# Patient Record
Sex: FEMALE | Race: WHITE | NOT HISPANIC OR LATINO
[De-identification: names, ages, dates, MRNs, and addresses within clinical notes are randomized per-mention and may not be internally consistent; named-entity substitution may affect disease eponyms.]

---

## 2017-05-04 ENCOUNTER — APPOINTMENT (OUTPATIENT)
Dept: DERMATOLOGY | Facility: CLINIC | Age: 42
End: 2017-05-04

## 2017-05-04 VITALS — HEIGHT: 62 IN | WEIGHT: 124 LBS | BODY MASS INDEX: 22.82 KG/M2

## 2017-05-04 DIAGNOSIS — Z78.9 OTHER SPECIFIED HEALTH STATUS: ICD-10-CM

## 2017-05-04 DIAGNOSIS — F17.200 NICOTINE DEPENDENCE, UNSPECIFIED, UNCOMPLICATED: ICD-10-CM

## 2017-05-04 DIAGNOSIS — Z86.69 PERSONAL HISTORY OF OTHER DISEASES OF THE NERVOUS SYSTEM AND SENSE ORGANS: ICD-10-CM

## 2017-05-04 DIAGNOSIS — Z86.79 PERSONAL HISTORY OF OTHER DISEASES OF THE CIRCULATORY SYSTEM: ICD-10-CM

## 2017-05-04 DIAGNOSIS — I78.1 NEVUS, NON-NEOPLASTIC: ICD-10-CM

## 2017-05-23 ENCOUNTER — APPOINTMENT (OUTPATIENT)
Dept: DERMATOLOGY | Facility: CLINIC | Age: 42
End: 2017-05-23

## 2017-05-23 DIAGNOSIS — Z41.1 ENCOUNTER FOR COSMETIC SURGERY: ICD-10-CM

## 2017-12-24 ENCOUNTER — INPATIENT (INPATIENT)
Facility: HOSPITAL | Age: 42
LOS: 2 days | Discharge: HOME | End: 2017-12-27
Attending: INTERNAL MEDICINE | Admitting: INTERNAL MEDICINE
Payer: COMMERCIAL

## 2017-12-24 VITALS — HEIGHT: 62 IN | WEIGHT: 117.07 LBS

## 2017-12-24 DIAGNOSIS — N20.0 CALCULUS OF KIDNEY: ICD-10-CM

## 2017-12-24 DIAGNOSIS — Z98.891 HISTORY OF UTERINE SCAR FROM PREVIOUS SURGERY: Chronic | ICD-10-CM

## 2017-12-24 LAB
ALBUMIN SERPL ELPH-MCNC: 4 G/DL — SIGNIFICANT CHANGE UP (ref 3.3–5)
ALP SERPL-CCNC: 84 U/L — SIGNIFICANT CHANGE UP (ref 40–120)
ALT FLD-CCNC: 18 U/L — SIGNIFICANT CHANGE UP (ref 12–78)
ANION GAP SERPL CALC-SCNC: 9 MMOL/L — SIGNIFICANT CHANGE UP (ref 5–17)
APPEARANCE UR: (no result)
AST SERPL-CCNC: 15 U/L — SIGNIFICANT CHANGE UP (ref 15–37)
BACTERIA # UR AUTO: (no result)
BILIRUB SERPL-MCNC: 0.9 MG/DL — SIGNIFICANT CHANGE UP (ref 0.2–1.2)
BILIRUB UR-MCNC: NEGATIVE — SIGNIFICANT CHANGE UP
BUN SERPL-MCNC: 21 MG/DL — SIGNIFICANT CHANGE UP (ref 7–23)
CALCIUM SERPL-MCNC: 9 MG/DL — SIGNIFICANT CHANGE UP (ref 8.5–10.1)
CHLORIDE SERPL-SCNC: 105 MMOL/L — SIGNIFICANT CHANGE UP (ref 96–108)
CO2 SERPL-SCNC: 22 MMOL/L — SIGNIFICANT CHANGE UP (ref 22–31)
COLOR SPEC: YELLOW — SIGNIFICANT CHANGE UP
CREAT SERPL-MCNC: 1.15 MG/DL — SIGNIFICANT CHANGE UP (ref 0.5–1.3)
DIFF PNL FLD: (no result)
EPI CELLS # UR: (no result)
GLUCOSE SERPL-MCNC: 102 MG/DL — HIGH (ref 70–99)
GLUCOSE UR QL: NEGATIVE MG/DL — SIGNIFICANT CHANGE UP
HCG SERPL-ACNC: <1 MIU/ML — SIGNIFICANT CHANGE UP
HCT VFR BLD CALC: 40.8 % — SIGNIFICANT CHANGE UP (ref 34.5–45)
HGB BLD-MCNC: 13.5 G/DL — SIGNIFICANT CHANGE UP (ref 11.5–15.5)
KETONES UR-MCNC: (no result)
LACTATE SERPL-SCNC: 1.1 MMOL/L — SIGNIFICANT CHANGE UP (ref 0.7–2)
LEUKOCYTE ESTERASE UR-ACNC: (no result)
LIDOCAIN IGE QN: 50 U/L — LOW (ref 73–393)
LYMPHOCYTES # BLD AUTO: 1 % — LOW (ref 13–44)
MANUAL DIF COMMENT BLD-IMP: SIGNIFICANT CHANGE UP
MCHC RBC-ENTMCNC: 30.8 PG — SIGNIFICANT CHANGE UP (ref 27–34)
MCHC RBC-ENTMCNC: 33.2 GM/DL — SIGNIFICANT CHANGE UP (ref 32–36)
MCV RBC AUTO: 92.9 FL — SIGNIFICANT CHANGE UP (ref 80–100)
NEUTROPHILS NFR BLD AUTO: 91 % — HIGH (ref 43–77)
NEUTS BAND # BLD: 8 % — SIGNIFICANT CHANGE UP (ref 0–8)
NITRITE UR-MCNC: NEGATIVE — SIGNIFICANT CHANGE UP
PH UR: 5 — SIGNIFICANT CHANGE UP (ref 5–8)
PLAT MORPH BLD: NORMAL — SIGNIFICANT CHANGE UP
PLATELET # BLD AUTO: 225 K/UL — SIGNIFICANT CHANGE UP (ref 150–400)
POTASSIUM SERPL-MCNC: 3.8 MMOL/L — SIGNIFICANT CHANGE UP (ref 3.5–5.3)
POTASSIUM SERPL-SCNC: 3.8 MMOL/L — SIGNIFICANT CHANGE UP (ref 3.5–5.3)
PROT SERPL-MCNC: 7.6 GM/DL — SIGNIFICANT CHANGE UP (ref 6–8.3)
PROT UR-MCNC: 15 MG/DL
RBC # BLD: 4.39 M/UL — SIGNIFICANT CHANGE UP (ref 3.8–5.2)
RBC # FLD: 11.4 % — SIGNIFICANT CHANGE UP (ref 10.3–14.5)
RBC BLD AUTO: NORMAL — SIGNIFICANT CHANGE UP
RBC CASTS # UR COMP ASSIST: (no result) /HPF (ref 0–4)
SODIUM SERPL-SCNC: 136 MMOL/L — SIGNIFICANT CHANGE UP (ref 135–145)
SP GR SPEC: 1.01 — SIGNIFICANT CHANGE UP (ref 1.01–1.02)
UROBILINOGEN FLD QL: NEGATIVE MG/DL — SIGNIFICANT CHANGE UP
WBC # BLD: 27.2 K/UL — HIGH (ref 3.8–10.5)
WBC # FLD AUTO: 27.2 K/UL — HIGH (ref 3.8–10.5)
WBC UR QL: (no result)

## 2017-12-24 PROCEDURE — 52332 CYSTOSCOPY AND TREATMENT: CPT | Mod: LT

## 2017-12-24 PROCEDURE — 74176 CT ABD & PELVIS W/O CONTRAST: CPT | Mod: 26

## 2017-12-24 PROCEDURE — 99253 IP/OBS CNSLTJ NEW/EST LOW 45: CPT | Mod: 25

## 2017-12-24 PROCEDURE — 99285 EMERGENCY DEPT VISIT HI MDM: CPT

## 2017-12-24 RX ORDER — MORPHINE SULFATE 50 MG/1
4 CAPSULE, EXTENDED RELEASE ORAL ONCE
Qty: 0 | Refills: 0 | Status: DISCONTINUED | OUTPATIENT
Start: 2017-12-24 | End: 2017-12-24

## 2017-12-24 RX ORDER — OXYCODONE AND ACETAMINOPHEN 5; 325 MG/1; MG/1
2 TABLET ORAL ONCE
Qty: 0 | Refills: 0 | Status: DISCONTINUED | OUTPATIENT
Start: 2017-12-24 | End: 2017-12-24

## 2017-12-24 RX ORDER — SODIUM CHLORIDE 9 MG/ML
1000 INJECTION INTRAMUSCULAR; INTRAVENOUS; SUBCUTANEOUS ONCE
Qty: 0 | Refills: 0 | Status: COMPLETED | OUTPATIENT
Start: 2017-12-24 | End: 2017-12-24

## 2017-12-24 RX ORDER — ACETAMINOPHEN 500 MG
650 TABLET ORAL EVERY 6 HOURS
Qty: 0 | Refills: 0 | Status: DISCONTINUED | OUTPATIENT
Start: 2017-12-24 | End: 2017-12-24

## 2017-12-24 RX ORDER — ATENOLOL 25 MG/1
0 TABLET ORAL
Qty: 0 | Refills: 0 | COMMUNITY

## 2017-12-24 RX ORDER — ONDANSETRON 8 MG/1
4 TABLET, FILM COATED ORAL ONCE
Qty: 0 | Refills: 0 | Status: DISCONTINUED | OUTPATIENT
Start: 2017-12-24 | End: 2017-12-24

## 2017-12-24 RX ORDER — ONDANSETRON 8 MG/1
4 TABLET, FILM COATED ORAL EVERY 6 HOURS
Qty: 0 | Refills: 0 | Status: DISCONTINUED | OUTPATIENT
Start: 2017-12-24 | End: 2017-12-24

## 2017-12-24 RX ORDER — ATENOLOL 25 MG/1
25 TABLET ORAL DAILY
Qty: 0 | Refills: 0 | Status: DISCONTINUED | OUTPATIENT
Start: 2017-12-24 | End: 2017-12-27

## 2017-12-24 RX ORDER — FENTANYL CITRATE 50 UG/ML
50 INJECTION INTRAVENOUS
Qty: 0 | Refills: 0 | Status: DISCONTINUED | OUTPATIENT
Start: 2017-12-24 | End: 2017-12-24

## 2017-12-24 RX ORDER — SODIUM CHLORIDE 9 MG/ML
1000 INJECTION INTRAMUSCULAR; INTRAVENOUS; SUBCUTANEOUS
Qty: 0 | Refills: 0 | Status: DISCONTINUED | OUTPATIENT
Start: 2017-12-24 | End: 2017-12-24

## 2017-12-24 RX ORDER — ACETAMINOPHEN 500 MG
650 TABLET ORAL EVERY 6 HOURS
Qty: 0 | Refills: 0 | Status: DISCONTINUED | OUTPATIENT
Start: 2017-12-24 | End: 2017-12-27

## 2017-12-24 RX ORDER — HEPARIN SODIUM 5000 [USP'U]/ML
5000 INJECTION INTRAVENOUS; SUBCUTANEOUS EVERY 8 HOURS
Qty: 0 | Refills: 0 | Status: DISCONTINUED | OUTPATIENT
Start: 2017-12-24 | End: 2017-12-24

## 2017-12-24 RX ORDER — MEPERIDINE HYDROCHLORIDE 50 MG/ML
12.5 INJECTION INTRAMUSCULAR; INTRAVENOUS; SUBCUTANEOUS
Qty: 0 | Refills: 0 | Status: DISCONTINUED | OUTPATIENT
Start: 2017-12-24 | End: 2017-12-24

## 2017-12-24 RX ORDER — MORPHINE SULFATE 50 MG/1
4 CAPSULE, EXTENDED RELEASE ORAL EVERY 4 HOURS
Qty: 0 | Refills: 0 | Status: DISCONTINUED | OUTPATIENT
Start: 2017-12-24 | End: 2017-12-24

## 2017-12-24 RX ORDER — OXYCODONE HYDROCHLORIDE 5 MG/1
5 TABLET ORAL EVERY 4 HOURS
Qty: 0 | Refills: 0 | Status: DISCONTINUED | OUTPATIENT
Start: 2017-12-24 | End: 2017-12-24

## 2017-12-24 RX ORDER — CIPROFLOXACIN LACTATE 400MG/40ML
400 VIAL (ML) INTRAVENOUS ONCE
Qty: 0 | Refills: 0 | Status: COMPLETED | OUTPATIENT
Start: 2017-12-24 | End: 2017-12-24

## 2017-12-24 RX ORDER — ATENOLOL 25 MG/1
25 TABLET ORAL DAILY
Qty: 0 | Refills: 0 | Status: DISCONTINUED | OUTPATIENT
Start: 2017-12-24 | End: 2017-12-24

## 2017-12-24 RX ORDER — CIPROFLOXACIN LACTATE 400MG/40ML
400 VIAL (ML) INTRAVENOUS EVERY 12 HOURS
Qty: 0 | Refills: 0 | Status: DISCONTINUED | OUTPATIENT
Start: 2017-12-24 | End: 2017-12-27

## 2017-12-24 RX ORDER — SENNA PLUS 8.6 MG/1
2 TABLET ORAL AT BEDTIME
Qty: 0 | Refills: 0 | Status: DISCONTINUED | OUTPATIENT
Start: 2017-12-24 | End: 2017-12-24

## 2017-12-24 RX ORDER — CIPROFLOXACIN LACTATE 400MG/40ML
400 VIAL (ML) INTRAVENOUS EVERY 12 HOURS
Qty: 0 | Refills: 0 | Status: DISCONTINUED | OUTPATIENT
Start: 2017-12-24 | End: 2017-12-24

## 2017-12-24 RX ADMIN — SODIUM CHLORIDE 1000 MILLILITER(S): 9 INJECTION INTRAMUSCULAR; INTRAVENOUS; SUBCUTANEOUS at 12:33

## 2017-12-24 RX ADMIN — Medication 200 MILLIGRAM(S): at 12:33

## 2017-12-24 RX ADMIN — MORPHINE SULFATE 4 MILLIGRAM(S): 50 CAPSULE, EXTENDED RELEASE ORAL at 15:31

## 2017-12-24 RX ADMIN — MORPHINE SULFATE 4 MILLIGRAM(S): 50 CAPSULE, EXTENDED RELEASE ORAL at 10:16

## 2017-12-24 RX ADMIN — SODIUM CHLORIDE 1000 MILLILITER(S): 9 INJECTION INTRAMUSCULAR; INTRAVENOUS; SUBCUTANEOUS at 09:35

## 2017-12-24 RX ADMIN — Medication 200 MILLIGRAM(S): at 23:22

## 2017-12-24 RX ADMIN — SODIUM CHLORIDE 100 MILLILITER(S): 9 INJECTION INTRAMUSCULAR; INTRAVENOUS; SUBCUTANEOUS at 18:46

## 2017-12-24 RX ADMIN — MORPHINE SULFATE 4 MILLIGRAM(S): 50 CAPSULE, EXTENDED RELEASE ORAL at 09:50

## 2017-12-24 RX ADMIN — MORPHINE SULFATE 4 MILLIGRAM(S): 50 CAPSULE, EXTENDED RELEASE ORAL at 09:35

## 2017-12-24 RX ADMIN — OXYCODONE AND ACETAMINOPHEN 2 TABLET(S): 5; 325 TABLET ORAL at 23:23

## 2017-12-24 RX ADMIN — ATENOLOL 25 MILLIGRAM(S): 25 TABLET ORAL at 16:59

## 2017-12-24 RX ADMIN — SODIUM CHLORIDE 100 MILLILITER(S): 9 INJECTION INTRAMUSCULAR; INTRAVENOUS; SUBCUTANEOUS at 16:09

## 2017-12-24 NOTE — ED PROVIDER NOTE - OBJECTIVE STATEMENT
41 y/o F presents to the ED c/o severe left back pain starting this morning. Pt with PSHx of . Pt has hx of severe menstrual cramps. Pain is in left lower back radiating to abd. No N/V/D. No fever. No falls. No dysuria. Pt can not find a comfortable position. Current smoker. Drinks occasionally. No CP. No HA. No SOB.

## 2017-12-24 NOTE — ED PROVIDER NOTE - PROGRESS NOTE DETAILS
Attending Dr. Josh Knight Dean Kaplan on behalf of Attending Dr. Knight. Spoke with Dr. Moreno. Feels Pt may need a stent. Will admit. Spoke with Dr. Cuadra about admission.

## 2017-12-24 NOTE — H&P ADULT - NSHPSOCIALHISTORY_GEN_ALL_CORE
Chronic cigarette smoker off and on for years 1PPD.  Admits to alcohol use (beers and tequila) periodically.  Going through a divorce.  Has a child 6 yr old.

## 2017-12-24 NOTE — ED ADULT NURSE NOTE - CHIEF COMPLAINT QUOTE
L side back. Patient states she has really bad menstual pain and heavy duty medicine not working. Whole left side of her back is so painful since 5 pm lst night.

## 2017-12-24 NOTE — CONSULT NOTE ADULT - PROBLEM SELECTOR RECOMMENDATION 9
A stent is recommended with ueteroscopy at a later date due to the white count and bacteriuria.  If this is not successful a nephrostomy will be done.

## 2017-12-24 NOTE — ED PROVIDER NOTE - CONSTITUTIONAL, MLM
normal... Well appearing, well nourished, awake, alert, oriented to person, place, time/situation. Pt in moderate distress due to pain.

## 2017-12-24 NOTE — H&P ADULT - NSHPPHYSICALEXAM_GEN_ALL_CORE
Vital Signs Last 24 Hrs  T(C): 37 (24 Dec 2017 08:48), Max: 37 (24 Dec 2017 08:48)  T(F): 98.6 (24 Dec 2017 08:48), Max: 98.6 (24 Dec 2017 08:48)  HR: 93 (24 Dec 2017 08:48) (93 - 93)  BP: 132/64 (24 Dec 2017 08:48) (132/64 - 132/64)  BP(mean): --  RR: 19 (24 Dec 2017 08:48) (19 - 19)  SpO2: 100% (24 Dec 2017 08:48) (100% - 100%)    PHYSICAL EXAM:    Constitutional: NAD, awake and alert, well-developed  HEENT: PERR, EOMI, Normal Hearing, MMM  Neck: Soft and supple  Respiratory: Breath sounds are clear bilaterally, No wheezing, rales or rhonchi  Cardiovascular: S1 and S2, regular rate and rhythm, no Murmurs, gallops or rubs  Gastrointestinal: Bowel Sounds present, soft, nontender, nondistended, no guarding, no rebound,   : + left sided flank pain/tenderness on palpation.  Extremities: No peripheral edema  Neurological: A/O x 3, no focal deficits  Skin: No rashes

## 2017-12-24 NOTE — H&P ADULT - ASSESSMENT
42 year old woman with HTN p/w left sided flank pain secondary to nephrolithiasis.    Nephrolithiasis with UTI/pyelonephritis  -profound leukocytosis  -0.4cm uterovesicular stone   -Uro consult - for stent placement  -c/w IV abx, ID consult (pen allergic)  -f/u Blood and Urine cultures  -NPO  -IVFs  -pain management    HTN:  -c/w atenolol    DVT ppx:  -heparin subc

## 2017-12-24 NOTE — H&P ADULT - HISTORY OF PRESENT ILLNESS
41 y/o woman with PMHx of heavy menstrual cramps, HTN who p/w severe left back pain starting this morning.  States pain is not improving with over the counter meds, unable to find a comfortable position.  She states she has had menstrual cramp pain but this pain is different, more severe and not improving. She denies fever, chills but admits to some nausea.    In ER: Found to have 0.4cm left sided kidney stone. She was given 2L bolus IVFs, IV cipro, cultures were drawn and urology was consulted.

## 2017-12-24 NOTE — BRIEF OPERATIVE NOTE - PROCEDURE
<<-----Click on this checkbox to enter Procedure Cystoscopy with insertion of ureteral stent  12/24/2017    Active  [facility]

## 2017-12-24 NOTE — CONSULT NOTE ADULT - SUBJECTIVE AND OBJECTIVE BOX
CHIEF COMPLAINT:Left flank pain    HISTORY OF PRESENT ILLNESS:Distal left stone, pain, white count    PAST MEDICAL & SURGICAL HISTORY:  Menorrhalgia  Essential hypertension  History of       REVIEW OF SYSTEMS:    CONSTITUTIONAL: In some distress  EYES/ENT: No visual changes;  No vertigo or throat pain   NECK: No pain or stiffness  RESPIRATORY: No cough, wheezing, hemoptysis; No shortness of breath  CARDIOVASCULAR: No chest pain or palpitations  GASTROINTESTINAL: No abdominal or epigastric pain. No nausea, vomiting, or hematemesis; No diarrhea or constipation. No melena or hematochezia.  GENITOURINARY: No dysuria, frequency or hematuria  NEUROLOGICAL: No numbness or weakness  SKIN: No itching, burning, rashes, or lesions   All other review of systems is negative unless indicated above.    MEDICATIONS  (STANDING):  ATENolol  Tablet 25 milliGRAM(s) Oral daily  ciprofloxacin   IVPB 400 milliGRAM(s) IV Intermittent every 12 hours  heparin  Injectable 5000 Unit(s) SubCutaneous every 8 hours  morphine  - Injectable 4 milliGRAM(s) IV Push once  sodium chloride 0.9%. 1000 milliLiter(s) (100 mL/Hr) IV Continuous <Continuous>    MEDICATIONS  (PRN):  acetaminophen   Tablet 650 milliGRAM(s) Oral every 6 hours PRN For Temp greater than 38 C (100.4 F)  acetaminophen   Tablet. 650 milliGRAM(s) Oral every 6 hours PRN Moderate Pain (4 - 6)  morphine  - Injectable 4 milliGRAM(s) IV Push every 4 hours PRN Severe Pain (7 - 10)  ondansetron Injectable 4 milliGRAM(s) IV Push every 6 hours PRN Nausea  senna 2 Tablet(s) Oral at bedtime PRN Constipation      Allergies    Cefzil (Hives)    Intolerances:Unknown        SOCIAL HISTORY:Homemaker    FAMILY HISTORY:  No pertinent family history in first degree relatives      Vital Signs Last 24 Hrs  T(C): 37 (24 Dec 2017 08:48), Max: 37 (24 Dec 2017 08:48)  T(F): 98.6 (24 Dec 2017 08:48), Max: 98.6 (24 Dec 2017 08:48)  HR: 93 (24 Dec 2017 08:48) (93 - 93)  BP: 132/64 (24 Dec 2017 08:48) (132/64 - 132/64)  BP(mean): --  RR: 19 (24 Dec 2017 08:48) (19 - 19)  SpO2: 100% (24 Dec 2017 08:48) (100% - 100%)    PHYSICAL EXAM:    Constitutional: NAD, well-developed  HEENT: RAMON, EOMI, Normal Hearing, MMM  Neck: No LAD, No JVD  Back: Normal spine flexure, No CVA tenderness  Respiratory: CTAB   Cardiovascular: S1 and S2, RRR, no M/G/R  Abd: BS+, soft, NT/ND, No C  Extremities: No peripheral edema  Vascular: 2+ peripheral pulses  Neurological: A/O x 3, no focal deficits  Psychiatric: Normal mood, normal affect  Musculoskeletal: 5/5 strength b/l upper and lower extremities  Skin: No rashes    LABS:                        13.5   27.2  )-----------( 225      ( 24 Dec 2017 09:36 )             40.8     12-    136  |  105  |  21  ----------------------------<  102<H>  3.8   |  22  |  1.15    Ca    9.0      24 Dec 2017 09:36    TPro  7.6  /  Alb  4.0  /  TBili  0.9  /  DBili  x   /  AST  15  /  ALT  18  /  AlkPhos  84  12-24      Urinalysis Basic - ( 24 Dec 2017 10:56 )    Color: Yellow / Appearance: Slightly Turbid / S.015 / pH: x  Gluc: x / Ketone: Moderate  / Bili: Negative / Urobili: Negative mg/dL   Blood: x / Protein: 15 mg/dL / Nitrite: Negative   Leuk Esterase: Moderate / RBC: 6-10 /HPF / WBC 26-50   Sq Epi: x / Non Sq Epi: Many / Bacteria: TNTC      Urine Culture:     RADIOLOGY & ADDITIONAL STUDIES:

## 2017-12-24 NOTE — H&P ADULT - NSHPLABSRESULTS_GEN_ALL_CORE
13.5   27.2  )-----------( 225      ( 24 Dec 2017 09:36 )             40.8     12    136  |  105  |  21  ----------------------------<  102<H>  3.8   |  22  |  1.15    Ca    9.0      24 Dec 2017 09:36    TPro  7.6  /  Alb  4.0  /  TBili  0.9  /  DBili  x   /  AST  15  /  ALT  18  /  AlkPhos  84  12-    CAPILLARY BLOOD GLUCOSE        LIVER FUNCTIONS - ( 24 Dec 2017 09:36 )  Alb: 4.0 g/dL / Pro: 7.6 gm/dL / ALK PHOS: 84 U/L / ALT: 18 U/L / AST: 15 U/L / GGT: x             Urinalysis Basic - ( 24 Dec 2017 10:56 )    Color: Yellow / Appearance: Slightly Turbid / S.015 / pH: x  Gluc: x / Ketone: Moderate  / Bili: Negative / Urobili: Negative mg/dL   Blood: x / Protein: 15 mg/dL / Nitrite: Negative   Leuk Esterase: Moderate / RBC: 6-10 /HPF / WBC 26-50   Sq Epi: x / Non Sq Epi: Many / Bacteria: TNTC      < from: CT Abdomen and Pelvis No Cont (. @ 12:22) >    IMPRESSION:     Mild left hydroureterOnephrosis secondary to a 0.4 cm calculus at the   ureteral aspect of the left ureterovesicular junction. Trace to small   left perinephric fluid and perinephric stranding.

## 2017-12-25 DIAGNOSIS — N12 TUBULO-INTERSTITIAL NEPHRITIS, NOT SPECIFIED AS ACUTE OR CHRONIC: ICD-10-CM

## 2017-12-25 LAB
ADD ON TEST-SPECIMEN IN LAB: SIGNIFICANT CHANGE UP
ANION GAP SERPL CALC-SCNC: 10 MMOL/L — SIGNIFICANT CHANGE UP (ref 5–17)
BASOPHILS # BLD AUTO: 0.1 K/UL — SIGNIFICANT CHANGE UP (ref 0–0.2)
BUN SERPL-MCNC: 25 MG/DL — HIGH (ref 7–23)
CALCIUM SERPL-MCNC: 8.2 MG/DL — LOW (ref 8.5–10.1)
CHLORIDE SERPL-SCNC: 108 MMOL/L — SIGNIFICANT CHANGE UP (ref 96–108)
CO2 SERPL-SCNC: 21 MMOL/L — LOW (ref 22–31)
CREAT SERPL-MCNC: 0.89 MG/DL — SIGNIFICANT CHANGE UP (ref 0.5–1.3)
CULTURE RESULTS: SIGNIFICANT CHANGE UP
E COLI DNA BLD POS QL NAA+NON-PROBE: SIGNIFICANT CHANGE UP
EOSINOPHIL # BLD AUTO: 0 K/UL — SIGNIFICANT CHANGE UP (ref 0–0.5)
GLUCOSE SERPL-MCNC: 90 MG/DL — SIGNIFICANT CHANGE UP (ref 70–99)
GRAM STN FLD: SIGNIFICANT CHANGE UP
HCT VFR BLD CALC: 31.8 % — LOW (ref 34.5–45)
HGB BLD-MCNC: 10.4 G/DL — LOW (ref 11.5–15.5)
LYMPHOCYTES # BLD AUTO: 1.4 K/UL — SIGNIFICANT CHANGE UP (ref 1–3.3)
LYMPHOCYTES # BLD AUTO: 4 % — LOW (ref 13–44)
MANUAL DIF COMMENT BLD-IMP: SIGNIFICANT CHANGE UP
MCHC RBC-ENTMCNC: 31 PG — SIGNIFICANT CHANGE UP (ref 27–34)
MCHC RBC-ENTMCNC: 32.7 GM/DL — SIGNIFICANT CHANGE UP (ref 32–36)
MCV RBC AUTO: 94.8 FL — SIGNIFICANT CHANGE UP (ref 80–100)
METHOD TYPE: SIGNIFICANT CHANGE UP
MONOCYTES # BLD AUTO: 1.7 K/UL — HIGH (ref 0–0.9)
MONOCYTES NFR BLD AUTO: 4 % — SIGNIFICANT CHANGE UP (ref 2–14)
NEUTROPHILS # BLD AUTO: 27 K/UL — HIGH (ref 1.8–7.4)
NEUTROPHILS NFR BLD AUTO: 89 % — HIGH (ref 43–77)
NEUTS BAND # BLD: 3 % — SIGNIFICANT CHANGE UP (ref 0–8)
ORGANISM # SPEC MICROSCOPIC CNT: SIGNIFICANT CHANGE UP
ORGANISM # SPEC MICROSCOPIC CNT: SIGNIFICANT CHANGE UP
PLAT MORPH BLD: NORMAL — SIGNIFICANT CHANGE UP
PLATELET # BLD AUTO: 120 K/UL — LOW (ref 150–400)
POTASSIUM SERPL-MCNC: 3.8 MMOL/L — SIGNIFICANT CHANGE UP (ref 3.5–5.3)
POTASSIUM SERPL-SCNC: 3.8 MMOL/L — SIGNIFICANT CHANGE UP (ref 3.5–5.3)
RBC # BLD: 3.36 M/UL — LOW (ref 3.8–5.2)
RBC # FLD: 11.7 % — SIGNIFICANT CHANGE UP (ref 10.3–14.5)
RBC BLD AUTO: NORMAL — SIGNIFICANT CHANGE UP
SODIUM SERPL-SCNC: 139 MMOL/L — SIGNIFICANT CHANGE UP (ref 135–145)
SPECIMEN SOURCE: SIGNIFICANT CHANGE UP
WBC # BLD: 30.1 K/UL — HIGH (ref 3.8–10.5)
WBC # FLD AUTO: 30.1 K/UL — HIGH (ref 3.8–10.5)

## 2017-12-25 RX ORDER — OXYCODONE AND ACETAMINOPHEN 5; 325 MG/1; MG/1
1 TABLET ORAL EVERY 4 HOURS
Qty: 0 | Refills: 0 | Status: DISCONTINUED | OUTPATIENT
Start: 2017-12-25 | End: 2017-12-27

## 2017-12-25 RX ORDER — SODIUM CHLORIDE 9 MG/ML
1000 INJECTION INTRAMUSCULAR; INTRAVENOUS; SUBCUTANEOUS
Qty: 0 | Refills: 0 | Status: DISCONTINUED | OUTPATIENT
Start: 2017-12-25 | End: 2017-12-27

## 2017-12-25 RX ORDER — OXYCODONE AND ACETAMINOPHEN 5; 325 MG/1; MG/1
1 TABLET ORAL EVERY 6 HOURS
Qty: 0 | Refills: 0 | Status: DISCONTINUED | OUTPATIENT
Start: 2017-12-25 | End: 2017-12-27

## 2017-12-25 RX ADMIN — Medication 200 MILLIGRAM(S): at 11:35

## 2017-12-25 RX ADMIN — OXYCODONE AND ACETAMINOPHEN 1 TABLET(S): 5; 325 TABLET ORAL at 10:18

## 2017-12-25 RX ADMIN — SODIUM CHLORIDE 125 MILLILITER(S): 9 INJECTION INTRAMUSCULAR; INTRAVENOUS; SUBCUTANEOUS at 22:23

## 2017-12-25 RX ADMIN — Medication 200 MILLIGRAM(S): at 22:24

## 2017-12-25 RX ADMIN — Medication 650 MILLIGRAM(S): at 06:08

## 2017-12-25 RX ADMIN — ATENOLOL 25 MILLIGRAM(S): 25 TABLET ORAL at 06:08

## 2017-12-25 RX ADMIN — OXYCODONE AND ACETAMINOPHEN 1 TABLET(S): 5; 325 TABLET ORAL at 18:26

## 2017-12-25 NOTE — PROGRESS NOTE ADULT - SUBJECTIVE AND OBJECTIVE BOX
CHIEF COMPLAINT:Same previous    HISTORY OF PRESENT ILLNESS:Same previous    PAST MEDICAL & SURGICAL HISTORY:  Menorrhalgia  Essential hypertension  History of       REVIEW OF SYSTEMS:Same previous  MEDICATIONS  (STANDING):  ATENolol  Tablet 25 milliGRAM(s) Oral daily  ciprofloxacin   IVPB 400 milliGRAM(s) IV Intermittent every 12 hours  sodium chloride 0.9%. 1000 milliLiter(s) (125 mL/Hr) IV Continuous <Continuous>    MEDICATIONS  (PRN):  acetaminophen   Tablet 650 milliGRAM(s) Oral every 6 hours PRN pain and fever  oxyCODONE    5 mG/acetaminophen 325 mG 1 Tablet(s) Oral every 6 hours PRN Moderate Pain (4 - 6)  oxyCODONE    5 mG/acetaminophen 325 mG 1 Tablet(s) Oral every 4 hours PRN Severe Pain (7 - 10)      PE:Same Previous    Allergies    Cefzil (Hives)    Intolerances        SOCIAL HISTORY:Same previous    FAMILY HISTORY:  No pertinent family history in first degree relatives      Vital Signs Last 24 Hrs  T(C): 36.4 (25 Dec 2017 11:23), Max: 37.4 (24 Dec 2017 19:00)  T(F): 97.6 (25 Dec 2017 11:23), Max: 99.4 (24 Dec 2017 19:00)  HR: 48 (25 Dec 2017 11:23) (45 - 100)  BP: 121/54 (25 Dec 2017 11:23) (102/51 - 132/54)  BP(mean): --  RR: 17 (25 Dec 2017 11:23) (12 - 17)  SpO2: 100% (25 Dec 2017 11:23) (96% - 100%)    PHYSICAL EXAM:Same previous    LABS:                        10.4   30.1  )-----------( 120      ( 25 Dec 2017 08:00 )             31.8     12    139  |  108  |  25<H>  ----------------------------<  90  3.8   |  21<L>  |  0.89    Ca    8.2<L>      25 Dec 2017 08:00    TPro  7.6  /  Alb  4.0  /  TBili  0.9  /  DBili  x   /  AST  15  /  ALT  18  /  AlkPhos  84  12-24      Urinalysis Basic - ( 24 Dec 2017 10:56 )    Color: Yellow / Appearance: Slightly Turbid / S.015 / pH: x  Gluc: x / Ketone: Moderate  / Bili: Negative / Urobili: Negative mg/dL   Blood: x / Protein: 15 mg/dL / Nitrite: Negative   Leuk Esterase: Moderate / RBC: 6-10 /HPF / WBC 26-50   Sq Epi: x / Non Sq Epi: Many / Bacteria: TNTC      Urine Culture:     RADIOLOGY & ADDITIONAL STUDIES:

## 2017-12-25 NOTE — PROGRESS NOTE ADULT - SUBJECTIVE AND OBJECTIVE BOX
41 y/o woman with PMHx of heavy menstrual cramps, HTN who p/w severe left back pain starting this morning.  States pain is not improving with over the counter meds, unable to find a comfortable position.  She states she has had menstrual cramp pain but this pain is different, more severe and not improving. She denies fever, chills but admits to some nausea.    In ER: Found to have 0.4cm left sided kidney stone. She was given 2L bolus IVFs, IV cipro, cultures were drawn and urology was consulted.             < from: CT Abdomen and Pelvis No Cont (12.24.17 @ 12:22) >   IMPRESSION:    Mild left hydroureterOnephrosis secondary to a 0.4 cm calculus at the   ureteral aspect of the left ureterovesicular junction. Trace to small  left perinephric fluid and perinephric stranding.    	    · Assessment		  42 year old woman with HTN p/w left sided flank pain secondary to nephrolithiasis.    Nephrolithiasis with UTI/pyelonephritis  -profound leukocytosis  -0.4cm uterovesicular stone   -Uro consult - for stent placement  -c/w IV abx, ID consult (pen allergic)  -f/u Blood and Urine cultures  -NPO  -IVFs  -pain management    HTN:  -c/w atenolol    DVT ppx:  -heparin subc 43 y/o woman with PMHx of heavy menstrual cramps, HTN who p/w severe left back pain starting this morning.  States pain is not improving with over the counter meds, unable to find a comfortable position.  She states she has had menstrual cramp pain but this pain is different, more severe and not improving. She denies fever, chills but admits to some nausea.    In ER: Found to have 0.4cm left sided kidney stone. She was given 2L bolus IVFs, IV cipro, cultures were drawn and urology was consulted.             < from: CT Abdomen and Pelvis No Cont (12.24.17 @ 12:22) >   IMPRESSION:    Mild left hydroureterOnephrosis secondary to a 0.4 cm calculus at the   ureteral aspect of the left ureterovesicular junction. Trace to small  left perinephric fluid and perinephric stranding.    	    · Assessment		  42 year old woman with HTN p/w left sided flank pain secondary to nephrolithiasis.  In ER, wbc ct 27, UA grossly positive, CT abd/pelvis  showed 0.4cm left sided kidney stone with L hydro and L perinephric fluid/stranding, was eval by urology and cystoscopy was performed and ureteral stent placed, blood cx/urine cx growing ecoli, pt given IV cipro, reports allergy to cephalosporins - had rash about 20 yrs ago.    Nephrolithiasis with UTI/pyelonephritis  -profound leukocytosis  -0.4cm uterovesicular stone   -Uro consult -s/p cysto and left ureteral stent  -c/w IV abx, ID consult (pen allergic)  -f/u Blood and Urine cultures  -NPO  -IVFs  -pain management    HTN:  -c/w atenolol    DVT ppx:  -heparin subc CHIEF COMPLAINT/Diagnosis: left ureterovesical stone/ sepsis    SUBJECTIVE: no complaints    REVIEW OF SYSTEMS:    CONSTITUTIONAL: No weakness, fevers or chills  EYES/ENT: No visual changes;  No vertigo or throat pain   NECK: No pain or stiffness  RESPIRATORY: No cough, wheezing, hemoptysis; No shortness of breath  CARDIOVASCULAR: No chest pain or palpitations  GASTROINTESTINAL: No abdominal or epigastric pain. No nausea, vomiting, or hematemesis; No diarrhea or constipation. No melena or hematochezia.  GENITOURINARY: No dysuria, frequency or hematuria  NEUROLOGICAL: No numbness or weakness  SKIN: No itching, burning, rashes, or lesions   All other review of systems is negative unless indicated above    Vital Signs Last 24 Hrs  T(C): 36.7 (25 Dec 2017 16:30), Max: 37.4 (24 Dec 2017 19:00)  T(F): 98.1 (25 Dec 2017 16:30), Max: 99.4 (24 Dec 2017 19:00)  HR: 55 (25 Dec 2017 16:30) (45 - 100)  BP: 124/60 (25 Dec 2017 16:30) (102/51 - 132/54)  BP(mean): --  RR: 17 (25 Dec 2017 16:30) (12 - 17)  SpO2: 100% (25 Dec 2017 16:30) (96% - 100%)    I&O's Summary    24 Dec 2017 07:01  -  25 Dec 2017 07:00  --------------------------------------------------------  IN: 745 mL / OUT: 300 mL / NET: 445 mL        CAPILLARY BLOOD GLUCOSE          PHYSICAL EXAM:    Constitutional: NAD, awake and alert, well-developed  HEENT: PERR, EOMI, Normal Hearing, MMM  Neck: Soft and supple, No LAD, No JVD  Respiratory: Breath sounds are clear bilaterally, No wheezing, rales or rhonchi  Cardiovascular: S1 and S2, regular rate and rhythm, no Murmurs, gallops or rubs  Gastrointestinal: Bowel Sounds present, soft, nontender, nondistended, no guarding, no rebound  Extremities: No peripheral edema  Vascular: 2+ peripheral pulses  Neurological: A/O x 3, no focal deficits  Musculoskeletal: 5/5 strength b/l upper and lower extremities  Skin: No rashes    MEDICATIONS:  MEDICATIONS  (STANDING):  ATENolol  Tablet 25 milliGRAM(s) Oral daily  ciprofloxacin   IVPB 400 milliGRAM(s) IV Intermittent every 12 hours  sodium chloride 0.9%. 1000 milliLiter(s) (125 mL/Hr) IV Continuous <Continuous>      LABS: All Labs Reviewed:                        10.4   30.1  )-----------( 120      ( 25 Dec 2017 08:00 )             31.8     12-25    139  |  108  |  25<H>  ----------------------------<  90  3.8   |  21<L>  |  0.89    Ca    8.2<L>      25 Dec 2017 08:00    TPro  7.6  /  Alb  4.0  /  TBili  0.9  /  DBili  x   /  AST  15  /  ALT  18  /  AlkPhos  84  12-24          Blood Culture: 12-24 @ 12:10  Organism Blood Culture PCR  Gram Stain Blood -- Gram Stain   Growth in aerobic bottle:  Gram Negative Rods  Growth in anaerobic bottle:  Gram Negative Rods  Specimen Source .Blood None  Culture-Blood --    12-24 @ 10:56  Organism --  Gram Stain Blood -- Gram Stain --  Specimen Source .Urine None  Culture-Blood --            < from: CT Abdomen and Pelvis No Cont (12.24.17 @ 12:22) >   IMPRESSION:    Mild left hydroureterOnephrosis secondary to a 0.4 cm calculus at the   ureteral aspect of the left ureterovesicular junction. Trace to small  left perinephric fluid and perinephric stranding.    	    · Assessment		  42 year old woman with HTN p/w left sided flank pain secondary to nephrolithiasis.  In ER, wbc ct 27, UA grossly positive, CT abd/pelvis  showed 0.4cm left sided kidney stone with L hydro and L perinephric fluid/stranding, was eval by urology and cystoscopy was performed and ureteral stent placed, blood cx/urine cx growing ecoli, pt given IV cipro, reports allergy to cephalosporins - had rash about 20 yrs ago.    1-Sepsis seecondary to Nephrolithiasis with UTI/pyelonephritis  -profound leukocytosis  -0.4cm uterovesicular stone   -Uro consult -s/p cysto and left ureteral stent  -c/w IV abx, ID consult (pen allergic)  -f/u Blood and Urine cultures  -NPO  -IVFs  -pain management    2-HTN:  -c/w atenolol    3-DVT ppx:  -heparin subc

## 2017-12-25 NOTE — CONSULT NOTE ADULT - ASSESSMENT
43 y/o woman with PMHx of heavy menstrual cramps, HTN who p/w severe left back pain starting 12/24 am. States pain is not improving with over the counter meds, unable to find a comfortable position.  She states she has had menstrual cramp pain but this pain is different, more severe and not improving. She denies fever, chills but admits to some nausea.  In ER, wbc ct 27, UA grossly positive, CT abd/pelvis  showed 0.4cm left sided kidney stone with L hydro and L perinephric fluid/stranding, was eval by urology and cystoscopy was performed and ureteral stent placed, blood cx/urine cx growing ecoli, pt given IV cipro, reports allergy to cephalosporins - had rash about 20 yrs ago.    1. Ecoli sepsis/L pyelonephritis/UTI/nephrolithiasis w/ L hydro/ZOFIA  - has cephalosporin allergy   - on IV cipro [id number] #2  - blood cx/urine cx growing ecoli 12/24  - f/u sensitivities  - continue with antibiotic coverage  - wbc ct elevated likely d/t stress of recent procedure  - urology f/u   - will require further stone treatment in future  - f/u cbc  - monitor temps  - tolerating abx well so far; no side effects noted  - reason for abx use and side effects reviewed with patient  - pain control     2. other issues - care per medicine

## 2017-12-25 NOTE — CONSULT NOTE ADULT - SUBJECTIVE AND OBJECTIVE BOX
Patient is a 42y old  Female who presents with a chief complaint of Left flank pain. (24 Dec 2017 14:28)      HPI:  41 y/o woman with PMHx of heavy menstrual cramps, HTN who p/w severe left back pain starting 12/24 am. States pain is not improving with over the counter meds, unable to find a comfortable position.  She states she has had menstrual cramp pain but this pain is different, more severe and not improving. She denies fever, chills but admits to some nausea.  In ER, wbc ct 27, UA grossly positive, CT abd/pelvis  showed 0.4cm left sided kidney stone with L hydro and L perinephric fluid/stranding, was eval by urology and cystoscopy was performed and ureteral stent placed, blood cx/urine cx growing ecoli, pt given IV cipro, reports allergy to cephalosporins - had rash about 20 yrs ago.    PMH: as above    PSH: as above    Meds: per reconciliation sheet, noted below    MEDICATIONS  (STANDING):  ATENolol  Tablet 25 milliGRAM(s) Oral daily  ciprofloxacin   IVPB 400 milliGRAM(s) IV Intermittent every 12 hours      Allergies    Cefzil (Hives)    Intolerances        Social: no smoking, no alcohol, no illegal drugs; no recent travel, no exposure to TB    Family history:  No pertinent family history in first degree relatives      ROS: no HA, no dizziness, no sore throat, no blurry vision, no CP, no palpitations, no SOB, no cough, no abdominal pain, no diarrhea,  no leg pain, no claudication, no rash, no joint aches, no rectal pain or bleeding, no night sweats    Vital Signs Last 24 Hrs  T(C): 36.5 (25 Dec 2017 05:00), Max: 37.4 (24 Dec 2017 10:15)  T(F): 97.7 (25 Dec 2017 05:00), Max: 99.4 (24 Dec 2017 19:00)  HR: 45 (25 Dec 2017 05:00) (45 - 100)  BP: 132/54 (25 Dec 2017 05:00) (102/51 - 132/54)  BP(mean): --  RR: 16 (25 Dec 2017 05:00) (12 - 18)  SpO2: 100% (25 Dec 2017 05:00) (96% - 100%)      PE:  Constitutional: frail looking  HEENT: NC/AT, EOMI, PERRLA  Neck: supple  Back: no tenderness  Respiratory: clear  Cardiovascular: S1S2 regular, no murmurs  Abdomen: soft, not tender, not distended, positive BS  Genitourinary: deferred, L flank pain   Rectal: deferred  Musculoskeletal: no muscle tenderness, no joint swelling or tenderness  Extremities: no pedal edema  Neurological:  no focal deficits  Skin: no rashes    Labs:                        10.4   30.1  )-----------( 120      ( 25 Dec 2017 08:00 )             31.8     12    139  |  108  |  25<H>  ----------------------------<  90  3.8   |  21<L>  |  0.89    Ca    8.2<L>      25 Dec 2017 08:00    TPro  7.6  /  Alb  4.0  /  TBili  0.9  /  DBili  x   /  AST  15  /  ALT  18  /  AlkPhos  84       LIVER FUNCTIONS - ( 24 Dec 2017 09:36 )  Alb: 4.0 g/dL / Pro: 7.6 gm/dL / ALK PHOS: 84 U/L / ALT: 18 U/L / AST: 15 U/L / GGT: x           Urinalysis Basic - ( 24 Dec 2017 10:56 )    Color: Yellow / Appearance: Slightly Turbid / S.015 / pH: x  Gluc: x / Ketone: Moderate  / Bili: Negative / Urobili: Negative mg/dL   Blood: x / Protein: 15 mg/dL / Nitrite: Negative   Leuk Esterase: Moderate / RBC: 6-10 /HPF / WBC 26-50   Sq Epi: x / Non Sq Epi: Many / Bacteria: [facility]    Culture - Urine (17 @ 10:56)    Specimen Source: .Urine None    Culture Results:   >100,000 CFU/ml Escherichia coli        Culture Results:   Growth in aerobic bottle:  Gram Negative Rods  Growth in anaerobic bottle:  Gram Negative Rods ( @ 12:10)  Culture Results:   Growth in anaerobic bottle: Gram Negative Rods  ***Blood Panel PCR results on this specimen are available  approximately 3 hours after the Gram stain result.***  Gram stain, PCR, and/or culture results may not always  correspond due to difference in methodologies.  ************************************************************  This PCR assay was performed using [employer].  The following targets are tested for: Enterococcus,  vancomycin resistant enterococci, Listeria monocytogenes,  coagulase negative staphylococci, S. aureus,  methicillin resistant S. aureus, Streptococcus agalactiae  (Group B), S. pneumoniae, S. pyogenes (Group A),  Acinetobacter baumannii, Enterobacter cloacae, E. coli,  Klebsiella oxytoca, K. pneumoniae, Proteus sp.,  Serratia marcescens, Haemophilus influenzae,  Neisseria meningitidis, Pseudomonas aeruginosa, Candida  albicans, C. glabrata, C krusei, C parapsilosis,  C. tropicalis and the KPC resistance gene.  Growth in aerobic bottle:  Gram Negative Rods ( @ 12:10)  Culture Results:   >100,000 CFU/ml Escherichia coli ( @ 10:56)      Radiology:  < from: CT Abdomen and Pelvis No Cont (17 @ 12:22) >  EXAM:  CT ABDOMEN AND PELVIS                            PROCEDURE DATE:  2017          INTERPRETATION:  Exam Type:  CT ABDOMEN AND PELVIS   Date and Time: 2017 12:22 PM  Indication: Left flank pain  Compared to: no previous  Technique: CT of the ABDOMEN and PELVIS:  No intravenous contrast was   administered at physician request. This limits sensitivity for certain   processes. Oral contrast was not administered.    COMMENTS:    LOWER LUNGS AND PLEURA: Left basilar linear atelectasis and/or scarring.    LIVER: within normal limits.  BILE DUCTS: normal caliber.  GALLBLADDER:      no wall thickening. Gallstones are not excluded.  PANCREAS: within normal limits.  SPLEEN: within normal limits.  ADRENALS: within normal limits.    KIDNEYS, URETERS AND BLADDER: Mild left hydroureterOnephrosis secondary   to a 0.4 cm calculus at the ureteral aspect of the left ureterovesicular   junction. Trace to small left perinephric fluid and perinephric   stranding. No right hydronephrosis. Additional bilateral punctate   nonobstructive intrarenal calculi. Right ureter is unremarkable. Bladder   is within normal limits.    PELVIS: Complex left ovarian cyst and/or follicle. No pelvic adenopathy.   Trace pelvic free fluid.    IUD visualized.    BOWEL: The unopacified bowel is of normal course and caliber without   evidence of obstruction or bowel wall thickening. A normal appendix is   visualized.     PERITONEUM: no ascites or free air, no fluid collection.  RETROPERITONEUM: within normallimits.      VESSELS:     within normal limits.  ABDOMINAL WALL: Tiny fat-containing umbilical hernia.   BONES: Minimal degenerative changes.     IMPRESSION:     Mild left hydroureterOnephrosis secondary to a 0.4 cm calculus at the   ureteral aspect of the left ureterovesicular junction. Trace to small   left perinephric fluid and perinephric stranding.    < end of copied text >    Advanced directives addressed: full resuscitation

## 2017-12-26 LAB
-  AMIKACIN: SIGNIFICANT CHANGE UP
-  AMIKACIN: SIGNIFICANT CHANGE UP
-  AMPICILLIN/SULBACTAM: SIGNIFICANT CHANGE UP
-  AMPICILLIN/SULBACTAM: SIGNIFICANT CHANGE UP
-  AMPICILLIN: SIGNIFICANT CHANGE UP
-  AMPICILLIN: SIGNIFICANT CHANGE UP
-  AZTREONAM: SIGNIFICANT CHANGE UP
-  AZTREONAM: SIGNIFICANT CHANGE UP
-  CEFAZOLIN: SIGNIFICANT CHANGE UP
-  CEFAZOLIN: SIGNIFICANT CHANGE UP
-  CEFEPIME: SIGNIFICANT CHANGE UP
-  CEFEPIME: SIGNIFICANT CHANGE UP
-  CEFOXITIN: SIGNIFICANT CHANGE UP
-  CEFOXITIN: SIGNIFICANT CHANGE UP
-  CEFTAZIDIME: SIGNIFICANT CHANGE UP
-  CEFTAZIDIME: SIGNIFICANT CHANGE UP
-  CEFTRIAXONE: SIGNIFICANT CHANGE UP
-  CEFTRIAXONE: SIGNIFICANT CHANGE UP
-  CIPROFLOXACIN: SIGNIFICANT CHANGE UP
-  CIPROFLOXACIN: SIGNIFICANT CHANGE UP
-  ERTAPENEM: SIGNIFICANT CHANGE UP
-  ERTAPENEM: SIGNIFICANT CHANGE UP
-  GENTAMICIN: SIGNIFICANT CHANGE UP
-  GENTAMICIN: SIGNIFICANT CHANGE UP
-  IMIPENEM: SIGNIFICANT CHANGE UP
-  IMIPENEM: SIGNIFICANT CHANGE UP
-  LEVOFLOXACIN: SIGNIFICANT CHANGE UP
-  LEVOFLOXACIN: SIGNIFICANT CHANGE UP
-  MEROPENEM: SIGNIFICANT CHANGE UP
-  MEROPENEM: SIGNIFICANT CHANGE UP
-  NITROFURANTOIN: SIGNIFICANT CHANGE UP
-  PIPERACILLIN/TAZOBACTAM: SIGNIFICANT CHANGE UP
-  PIPERACILLIN/TAZOBACTAM: SIGNIFICANT CHANGE UP
-  TOBRAMYCIN: SIGNIFICANT CHANGE UP
-  TOBRAMYCIN: SIGNIFICANT CHANGE UP
-  TRIMETHOPRIM/SULFAMETHOXAZOLE: SIGNIFICANT CHANGE UP
-  TRIMETHOPRIM/SULFAMETHOXAZOLE: SIGNIFICANT CHANGE UP
ANION GAP SERPL CALC-SCNC: 11 MMOL/L — SIGNIFICANT CHANGE UP (ref 5–17)
BASOPHILS # BLD AUTO: 0.1 K/UL — SIGNIFICANT CHANGE UP (ref 0–0.2)
BASOPHILS NFR BLD AUTO: 0 % — SIGNIFICANT CHANGE UP (ref 0–2)
BUN SERPL-MCNC: 18 MG/DL — SIGNIFICANT CHANGE UP (ref 7–23)
CALCIUM SERPL-MCNC: 8 MG/DL — LOW (ref 8.5–10.1)
CHLORIDE SERPL-SCNC: 111 MMOL/L — HIGH (ref 96–108)
CO2 SERPL-SCNC: 21 MMOL/L — LOW (ref 22–31)
CREAT SERPL-MCNC: 0.73 MG/DL — SIGNIFICANT CHANGE UP (ref 0.5–1.3)
CULTURE RESULTS: SIGNIFICANT CHANGE UP
CULTURE RESULTS: SIGNIFICANT CHANGE UP
EOSINOPHIL # BLD AUTO: 0.1 K/UL — SIGNIFICANT CHANGE UP (ref 0–0.5)
EOSINOPHIL NFR BLD AUTO: 0 % — SIGNIFICANT CHANGE UP (ref 0–6)
GLUCOSE SERPL-MCNC: 96 MG/DL — SIGNIFICANT CHANGE UP (ref 70–99)
HCT VFR BLD CALC: 33.4 % — LOW (ref 34.5–45)
HGB BLD-MCNC: 11.2 G/DL — LOW (ref 11.5–15.5)
LYMPHOCYTES # BLD AUTO: 14 % — SIGNIFICANT CHANGE UP (ref 13–44)
LYMPHOCYTES # BLD AUTO: 3 K/UL — SIGNIFICANT CHANGE UP (ref 1–3.3)
MANUAL DIF COMMENT BLD-IMP: SIGNIFICANT CHANGE UP
MCHC RBC-ENTMCNC: 31.5 PG — SIGNIFICANT CHANGE UP (ref 27–34)
MCHC RBC-ENTMCNC: 33.6 GM/DL — SIGNIFICANT CHANGE UP (ref 32–36)
MCV RBC AUTO: 93.8 FL — SIGNIFICANT CHANGE UP (ref 80–100)
METHOD TYPE: SIGNIFICANT CHANGE UP
METHOD TYPE: SIGNIFICANT CHANGE UP
MONOCYTES # BLD AUTO: 1.7 K/UL — HIGH (ref 0–0.9)
MONOCYTES NFR BLD AUTO: 8 % — SIGNIFICANT CHANGE UP (ref 2–14)
NEUTROPHILS # BLD AUTO: 15.9 K/UL — HIGH (ref 1.8–7.4)
NEUTROPHILS NFR BLD AUTO: 77 % — SIGNIFICANT CHANGE UP (ref 43–77)
NEUTS BAND # BLD: 1 % — SIGNIFICANT CHANGE UP (ref 0–8)
ORGANISM # SPEC MICROSCOPIC CNT: SIGNIFICANT CHANGE UP
PLAT MORPH BLD: NORMAL — SIGNIFICANT CHANGE UP
PLATELET # BLD AUTO: 146 K/UL — LOW (ref 150–400)
PLATELET COUNT - ESTIMATE: (no result)
POTASSIUM SERPL-MCNC: 3.3 MMOL/L — LOW (ref 3.5–5.3)
POTASSIUM SERPL-SCNC: 3.3 MMOL/L — LOW (ref 3.5–5.3)
RBC # BLD: 3.56 M/UL — LOW (ref 3.8–5.2)
RBC # FLD: 11.6 % — SIGNIFICANT CHANGE UP (ref 10.3–14.5)
RBC BLD AUTO: NORMAL — SIGNIFICANT CHANGE UP
SODIUM SERPL-SCNC: 143 MMOL/L — SIGNIFICANT CHANGE UP (ref 135–145)
SPECIMEN SOURCE: SIGNIFICANT CHANGE UP
SPECIMEN SOURCE: SIGNIFICANT CHANGE UP
WBC # BLD: 20.7 K/UL — HIGH (ref 3.8–10.5)
WBC # FLD AUTO: 20.7 K/UL — HIGH (ref 3.8–10.5)

## 2017-12-26 PROCEDURE — 99231 SBSQ HOSP IP/OBS SF/LOW 25: CPT

## 2017-12-26 RX ORDER — POTASSIUM CHLORIDE 20 MEQ
40 PACKET (EA) ORAL ONCE
Qty: 0 | Refills: 0 | Status: COMPLETED | OUTPATIENT
Start: 2017-12-26 | End: 2017-12-26

## 2017-12-26 RX ADMIN — OXYCODONE AND ACETAMINOPHEN 1 TABLET(S): 5; 325 TABLET ORAL at 17:13

## 2017-12-26 RX ADMIN — Medication 40 MILLIEQUIVALENT(S): at 11:10

## 2017-12-26 RX ADMIN — OXYCODONE AND ACETAMINOPHEN 1 TABLET(S): 5; 325 TABLET ORAL at 03:22

## 2017-12-26 RX ADMIN — OXYCODONE AND ACETAMINOPHEN 1 TABLET(S): 5; 325 TABLET ORAL at 08:15

## 2017-12-26 RX ADMIN — OXYCODONE AND ACETAMINOPHEN 1 TABLET(S): 5; 325 TABLET ORAL at 03:58

## 2017-12-26 RX ADMIN — ATENOLOL 25 MILLIGRAM(S): 25 TABLET ORAL at 05:07

## 2017-12-26 RX ADMIN — Medication 200 MILLIGRAM(S): at 12:09

## 2017-12-26 RX ADMIN — OXYCODONE AND ACETAMINOPHEN 1 TABLET(S): 5; 325 TABLET ORAL at 12:09

## 2017-12-26 RX ADMIN — Medication 200 MILLIGRAM(S): at 22:00

## 2017-12-26 RX ADMIN — OXYCODONE AND ACETAMINOPHEN 1 TABLET(S): 5; 325 TABLET ORAL at 21:24

## 2017-12-26 NOTE — PROGRESS NOTE ADULT - ASSESSMENT
43 y/o woman with PMHx of heavy menstrual cramps, HTN who p/w severe left back pain starting 12/24 am. States pain is not improving with over the counter meds, unable to find a comfortable position.  She states she has had menstrual cramp pain but this pain is different, more severe and not improving. She denies fever, chills but admits to some nausea.  In ER, wbc ct 27, UA grossly positive, CT abd/pelvis  showed 0.4cm left sided kidney stone with L hydro and L perinephric fluid/stranding, was eval by urology and cystoscopy was performed and ureteral stent placed, blood cx/urine cx growing ecoli, pt given IV cipro, reports allergy to cephalosporins - had rash about 20 yrs ago.    1. Ecoli sepsis/L pyelonephritis/UTI/nephrolithiasis w/ L hydro/ZOFIA  - slowly improving  - has cephalosporin allergy   - on IV cipro [id number] #3  - blood cx/urine cx growing ecoli 12/24  - urine cx with pan-sensitive ecoli   - once improved, can switch to po cipro 500mg q12h to complete total 10-14 day course  - continue with antibiotic coverage  - urology f/u   - will require further stone treatment in future  - f/u cbc  - monitor temps  - tolerating abx well so far; no side effects noted  - reason for abx use and side effects reviewed with patient  - pain control     2. other issues - care per medicine

## 2017-12-26 NOTE — PROGRESS NOTE ADULT - SUBJECTIVE AND OBJECTIVE BOX
41 y/o woman with PMHx of heavy menstrual cramps, HTN who p/w severe left back pain starting 12/24 am. States pain is not improving with over the counter meds, unable to find a comfortable position.  She states she has had menstrual cramp pain but this pain is different, more severe and not improving. She denies fever, chills but admits to some nausea.  In ER, wbc ct 27, UA grossly positive, CT abd/pelvis  showed 0.4cm left sided kidney stone with L hydro and L perinephric fluid/stranding, was eval by urology and cystoscopy was performed and ureteral stent placed, blood cx/urine cx growing ecoli, pt given IV cipro, reports allergy to cephalosporins - had rash about 20 yrs ago.      afebrile  still w/some L flank pain but better  urinating without difficulty      MEDICATIONS  (STANDING):  ATENolol  Tablet 25 milliGRAM(s) Oral daily  ciprofloxacin   IVPB 400 milliGRAM(s) IV Intermittent every 12 hours  sodium chloride 0.9%. 1000 milliLiter(s) (125 mL/Hr) IV Continuous <Continuous>      Vital Signs Last 24 Hrs  T(C): 36.6 (26 Dec 2017 11:59), Max: 36.7 (25 Dec 2017 16:30)  T(F): 97.8 (26 Dec 2017 11:59), Max: 98.1 (25 Dec 2017 16:30)  HR: 56 (26 Dec 2017 11:59) (55 - 60)  BP: 165/67 (26 Dec 2017 11:59) (124/60 - 165/67)  BP(mean): --  RR: 16 (26 Dec 2017 11:59) (16 - 17)  SpO2: 98% (26 Dec 2017 11:59) (98% - 100%)            PE:  Constitutional: frail looking  HEENT: NC/AT, EOMI, PERRLA  Neck: supple  Back: no tenderness  Respiratory: clear  Cardiovascular: S1S2 regular, no murmurs  Abdomen: soft, not tender, not distended, positive BS  Genitourinary: deferred, L flank pain   Rectal: deferred  Musculoskeletal: no muscle tenderness, no joint swelling or tenderness  Extremities: no pedal edema  Neurological:  no focal deficits  Skin: no rashes    Labs:                        11.2   20.7  )-----------( 146      ( 26 Dec 2017 07:16 )             33.4     12-26    143  |  111<H>  |  18  ----------------------------<  96  3.3<L>   |  21<L>  |  0.73    Ca    8.0<L>      26 Dec 2017 07:16               Culture - Blood (12.24.17 @ 12:10)    Gram Stain:   Growth in anaerobic bottle: Gram Negative Rods  Growth in aerobic bottle:  Gram Negative Rods    -  Escherichia coli: Detec    Specimen Source: .Blood None    Organism: Blood Culture PCR    Culture Results:   Growth in aerobic and anaerobic bottles: Escherichia coli  See previous culture [id number]  ***Blood Panel PCR results on this specimen are available  approximately 3 hours after the Gram stain result.***  Gram stain, PCR, and/or culture results may not always  correspond due to difference in methodologies.  ************************************************************  This PCR assay was performed using [employer].  The following targets are tested for: Enterococcus,  vancomycin resistant enterococci, Listeria monocytogenes,  coagulase negative staphylococci, S. aureus,  methicillin resistant S. aureus, Streptococcus agalactiae  (Group B), S. pneumoniae, S. pyogenes (Group A),  Acinetobacter baumannii, Enterobacter cloacae, E. coli,  Klebsiella oxytoca, K. pneumoniae, Proteus sp.,  Serratia marcescens, Haemophilus influenzae,  Neisseria meningitidis, Pseudomonas aeruginosa, Candida  albicans, C. glabrata, C krusei, C parapsilosis,  C. tropicalis and the KPC resistance gene.    Organism Identification: Blood Culture PCR    Method Type: PCR        Culture - Blood (12.24.17 @ 12:10)    Gram Stain:   Growth in aerobic bottle:  Gram Negative Rods  Growth in anaerobic bottle:  Gram Negative Rods    Specimen Source: .Blood None    Culture Results:   Growth in aerobic and anaerobic bottles: Escherichia coli    Culture - Urine (12.24.17 @ 10:56)    -  Amikacin: S <=8    -  Ampicillin: S 4    -  Ampicillin/Sulbactam: S <=4/2    -  Aztreonam: S <=4    -  Cefazolin: S <=2    -  Cefepime: S <=2    -  Cefoxitin: S <=4    -  Ceftazidime: S <=1    -  Ceftriaxone: S <=1    -  Ciprofloxacin: S <=0.5    -  Ertapenem: S <=0.5    -  Gentamicin: S <=1    -  Imipenem: S <=1    -  Levofloxacin: S <=1    -  Meropenem: S <=1    -  Nitrofurantoin: S <=32    -  Piperacillin/Tazobactam: S <=8    -  Tobramycin: S <=2    -  Trimethoprim/Sulfamethoxazole: S <=0.5/9.5    Specimen Source: .Urine None    Culture Results:   >100,000 CFU/ml Escherichia coli    Organism Identification: Escherichia coli    Organism: Escherichia coli    Method Type: JOHNNY          Radiology:  < from: CT Abdomen and Pelvis No Cont (12.24.17 @ 12:22) >  EXAM:  CT ABDOMEN AND PELVIS                            PROCEDURE DATE:  12/24/2017          INTERPRETATION:  Exam Type:  CT ABDOMEN AND PELVIS   Date and Time: 12/24/2017 12:22 PM  Indication: Left flank pain  Compared to: no previous  Technique: CT of the ABDOMEN and PELVIS:  No intravenous contrast was   administered at physician request. This limits sensitivity for certain   processes. Oral contrast was not administered.    COMMENTS:    LOWER LUNGS AND PLEURA: Left basilar linear atelectasis and/or scarring.    LIVER: within normal limits.  BILE DUCTS: normal caliber.  GALLBLADDER:      no wall thickening. Gallstones are not excluded.  PANCREAS: within normal limits.  SPLEEN: within normal limits.  ADRENALS: within normal limits.    KIDNEYS, URETERS AND BLADDER: Mild left hydroureterOnephrosis secondary   to a 0.4 cm calculus at the ureteral aspect of the left ureterovesicular   junction. Trace to small left perinephric fluid and perinephric   stranding. No right hydronephrosis. Additional bilateral punctate   nonobstructive intrarenal calculi. Right ureter is unremarkable. Bladder   is within normal limits.    PELVIS: Complex left ovarian cyst and/or follicle. No pelvic adenopathy.   Trace pelvic free fluid.    IUD visualized.    BOWEL: The unopacified bowel is of normal course and caliber without   evidence of obstruction or bowel wall thickening. A normal appendix is   visualized.     PERITONEUM: no ascites or free air, no fluid collection.  RETROPERITONEUM: within normallimits.      VESSELS:     within normal limits.  ABDOMINAL WALL: Tiny fat-containing umbilical hernia.   BONES: Minimal degenerative changes.     IMPRESSION:     Mild left hydroureterOnephrosis secondary to a 0.4 cm calculus at the   ureteral aspect of the left ureterovesicular junction. Trace to small   left perinephric fluid and perinephric stranding.    < end of copied text >    Advanced directives addressed: full resuscitation

## 2017-12-26 NOTE — PROGRESS NOTE ADULT - SUBJECTIVE AND OBJECTIVE BOX
CHIEF COMPLAINT/Diagnosis: left ureteral stone/ left ureteropelvic    SUBJECTIVE: no complaints    REVIEW OF SYSTEMS:    CONSTITUTIONAL: No weakness, fevers or chills  EYES/ENT: No visual changes;  No vertigo or throat pain   NECK: No pain or stiffness  RESPIRATORY: No cough, wheezing, hemoptysis; No shortness of breath  CARDIOVASCULAR: No chest pain or palpitations  GASTROINTESTINAL: No abdominal or epigastric pain. No nausea, vomiting, or hematemesis; No diarrhea or constipation. No melena or hematochezia.  GENITOURINARY: No dysuria, frequency or hematuria  NEUROLOGICAL: No numbness or weakness  SKIN: No itching, burning, rashes, or lesions   All other review of systems is negative unless indicated above    Vital Signs Last 24 Hrs  T(C): 36.6 (26 Dec 2017 11:59), Max: 36.7 (25 Dec 2017 16:30)  T(F): 97.8 (26 Dec 2017 11:59), Max: 98.1 (25 Dec 2017 16:30)  HR: 56 (26 Dec 2017 11:59) (55 - 60)  BP: 165/67 (26 Dec 2017 11:59) (124/60 - 165/67)  BP(mean): --  RR: 16 (26 Dec 2017 11:59) (16 - 17)  SpO2: 98% (26 Dec 2017 11:59) (98% - 100%)    I&O's Summary    25 Dec 2017 07:01  -  26 Dec 2017 07:00  --------------------------------------------------------  IN: 1000 mL / OUT: 0 mL / NET: 1000 mL        CAPILLARY BLOOD GLUCOSE          PHYSICAL EXAM:    Constitutional: NAD, awake and alert, well-developed  HEENT: PERR, EOMI, Normal Hearing, MMM  Neck: Soft and supple, No LAD, No JVD  Respiratory: Breath sounds are clear bilaterally, No wheezing, rales or rhonchi  Cardiovascular: S1 and S2, regular rate and rhythm, no Murmurs, gallops or rubs  Gastrointestinal: Bowel Sounds present, soft, nontender, nondistended, no guarding, no rebound  Extremities: No peripheral edema  Vascular: 2+ peripheral pulses  Neurological: A/O x 3, no focal deficits  Musculoskeletal: 5/5 strength b/l upper and lower extremities  Skin: No rashes    MEDICATIONS:  MEDICATIONS  (STANDING):  ATENolol  Tablet 25 milliGRAM(s) Oral daily  ciprofloxacin   IVPB 400 milliGRAM(s) IV Intermittent every 12 hours  sodium chloride 0.9%. 1000 milliLiter(s) (125 mL/Hr) IV Continuous <Continuous>      LABS: All Labs Reviewed:                        11.2   20.7  )-----------( 146      ( 26 Dec 2017 07:16 )             33.4     12-26    143  |  111<H>  |  18  ----------------------------<  96  3.3<L>   |  21<L>  |  0.73    Ca    8.0<L>      26 Dec 2017 07:16            Blood Culture: 12-24 @ 12:10  Organism Blood Culture PCR  Gram Stain Blood -- Gram Stain   Growth in aerobic bottle:  Gram Negative Rods  Growth in anaerobic bottle:  Gram Negative Rods  Specimen Source .Blood None  Culture-Blood --    12-24 @ 10:56  Organism Escherichia coli  Gram Stain Blood -- Gram Stain --  Specimen Source .Urine None  Culture-Blood --          < from: CT Abdomen and Pelvis No Cont (12.24.17 @ 12:22) >    IMPRESSION:     Mild left hydroureterOnephrosis secondary to a 0.4 cm calculus at the   ureteral aspect of the left ureterovesicular junction. Trace to small   left perinephric fluid and perinephric stranding.             Assessment and Plan    42 year old woman with HTN p/w left sided flank pain secondary to nephrolithiasis.  In ER, wbc ct 27, UA grossly positive, CT abd/pelvis  showed 0.4cm left sided kidney stone with L hydro and L perinephric fluid/stranding, was eval by urology and cystoscopy was performed and ureteral stent placed, blood cx/urine cx growing ecoli, pt given IV cipro, reports allergy to cephalosporins - had rash about 20 yrs ago.    1-Sepsis seecondary to Nephrolithiasis with UTI/pyelonephritis and bacteremia, ecoli  -profound leukocytosis improving : 30  >> 20.7   -0.4cm uterovesicular stone   -Uro consult -s/p cysto and left ureteral stent  -c/w IV abx, ID consult (pen allergic); cipro  -blood cultures and urine cultures growing : Ecoli 12/24   -repeat blood cultures sent today  -NPO  -IVFs  -pain management    2-HTN:  -c/w atenolol    3-DVT ppx:  -heparin sub

## 2017-12-27 ENCOUNTER — TRANSCRIPTION ENCOUNTER (OUTPATIENT)
Age: 42
End: 2017-12-27

## 2017-12-27 VITALS
OXYGEN SATURATION: 99 % | TEMPERATURE: 98 F | SYSTOLIC BLOOD PRESSURE: 158 MMHG | RESPIRATION RATE: 18 BRPM | HEART RATE: 68 BPM | DIASTOLIC BLOOD PRESSURE: 85 MMHG

## 2017-12-27 LAB
ANION GAP SERPL CALC-SCNC: 9 MMOL/L — SIGNIFICANT CHANGE UP (ref 5–17)
BASOPHILS # BLD AUTO: 0.1 K/UL — SIGNIFICANT CHANGE UP (ref 0–0.2)
BASOPHILS NFR BLD AUTO: 0.6 % — SIGNIFICANT CHANGE UP (ref 0–2)
BUN SERPL-MCNC: 7 MG/DL — SIGNIFICANT CHANGE UP (ref 7–23)
CALCIUM SERPL-MCNC: 8.6 MG/DL — SIGNIFICANT CHANGE UP (ref 8.5–10.1)
CHLORIDE SERPL-SCNC: 103 MMOL/L — SIGNIFICANT CHANGE UP (ref 96–108)
CO2 SERPL-SCNC: 26 MMOL/L — SIGNIFICANT CHANGE UP (ref 22–31)
CREAT SERPL-MCNC: 0.58 MG/DL — SIGNIFICANT CHANGE UP (ref 0.5–1.3)
EOSINOPHIL # BLD AUTO: 0.1 K/UL — SIGNIFICANT CHANGE UP (ref 0–0.5)
EOSINOPHIL NFR BLD AUTO: 0.7 % — SIGNIFICANT CHANGE UP (ref 0–6)
GLUCOSE SERPL-MCNC: 86 MG/DL — SIGNIFICANT CHANGE UP (ref 70–99)
HCT VFR BLD CALC: 33.3 % — LOW (ref 34.5–45)
HGB BLD-MCNC: 11.1 G/DL — LOW (ref 11.5–15.5)
LYMPHOCYTES # BLD AUTO: 16.5 % — SIGNIFICANT CHANGE UP (ref 13–44)
LYMPHOCYTES # BLD AUTO: 2 K/UL — SIGNIFICANT CHANGE UP (ref 1–3.3)
MANUAL DIF COMMENT BLD-IMP: SIGNIFICANT CHANGE UP
MCHC RBC-ENTMCNC: 31.1 PG — SIGNIFICANT CHANGE UP (ref 27–34)
MCHC RBC-ENTMCNC: 33.4 GM/DL — SIGNIFICANT CHANGE UP (ref 32–36)
MCV RBC AUTO: 93.2 FL — SIGNIFICANT CHANGE UP (ref 80–100)
MONOCYTES # BLD AUTO: 1.4 K/UL — HIGH (ref 0–0.9)
MONOCYTES NFR BLD AUTO: 11.4 % — SIGNIFICANT CHANGE UP (ref 2–14)
NEUTROPHILS # BLD AUTO: 8.7 K/UL — HIGH (ref 1.8–7.4)
NEUTROPHILS NFR BLD AUTO: 70.8 % — SIGNIFICANT CHANGE UP (ref 43–77)
PLAT MORPH BLD: NORMAL — SIGNIFICANT CHANGE UP
PLATELET # BLD AUTO: 149 K/UL — LOW (ref 150–400)
POTASSIUM SERPL-MCNC: 3.2 MMOL/L — LOW (ref 3.5–5.3)
POTASSIUM SERPL-SCNC: 3.2 MMOL/L — LOW (ref 3.5–5.3)
RBC # BLD: 3.57 M/UL — LOW (ref 3.8–5.2)
RBC # FLD: 11.7 % — SIGNIFICANT CHANGE UP (ref 10.3–14.5)
RBC BLD AUTO: NORMAL — SIGNIFICANT CHANGE UP
SODIUM SERPL-SCNC: 138 MMOL/L — SIGNIFICANT CHANGE UP (ref 135–145)
WBC # BLD: 12.3 K/UL — HIGH (ref 3.8–10.5)
WBC # FLD AUTO: 12.3 K/UL — HIGH (ref 3.8–10.5)

## 2017-12-27 RX ORDER — POTASSIUM CHLORIDE 20 MEQ
40 PACKET (EA) ORAL
Qty: 0 | Refills: 0 | Status: DISCONTINUED | OUTPATIENT
Start: 2017-12-27 | End: 2017-12-27

## 2017-12-27 RX ORDER — MOXIFLOXACIN HYDROCHLORIDE TABLETS, 400 MG 400 MG/1
1 TABLET, FILM COATED ORAL
Qty: 28 | Refills: 0 | OUTPATIENT
Start: 2017-12-27 | End: 2018-01-09

## 2017-12-27 RX ADMIN — OXYCODONE AND ACETAMINOPHEN 1 TABLET(S): 5; 325 TABLET ORAL at 05:53

## 2017-12-27 RX ADMIN — Medication 40 MILLIEQUIVALENT(S): at 09:10

## 2017-12-27 RX ADMIN — OXYCODONE AND ACETAMINOPHEN 1 TABLET(S): 5; 325 TABLET ORAL at 10:01

## 2017-12-27 RX ADMIN — ATENOLOL 25 MILLIGRAM(S): 25 TABLET ORAL at 05:50

## 2017-12-27 RX ADMIN — OXYCODONE AND ACETAMINOPHEN 1 TABLET(S): 5; 325 TABLET ORAL at 01:45

## 2017-12-27 NOTE — DISCHARGE NOTE ADULT - CARE PROVIDER_API CALL
Josesito Moreno (MD), Urology  [address] [facility]  [address] [place] [address]  Phone: [phone]  Fax: [phone]

## 2017-12-27 NOTE — DISCHARGE NOTE ADULT - MEDICATION SUMMARY - MEDICATIONS TO TAKE
I will START or STAY ON the medications listed below when I get home from the hospital:    naproxen 500 mg oral tablet  -- 1 tab(s) by mouth 2 times a day, As Needed for menstrual pain  -- Indication: For Pain    SUMAtriptan 100 mg oral tablet  -- 1 tab(s) by mouth 2 times a day at least 2 hours between doses as needed  -- Indication: For Headache    atenolol 25 mg oral tablet  -- 1 tab(s) by mouth once a day  -- Indication: For Hypertension    Fish Oil 1000 mg oral capsule  -- 1 cap(s) by mouth once a day  -- Indication: For Hyperlipidemia    Cipro 500 mg oral tablet  -- 1 tab(s) by mouth 2 times a day   -- Avoid prolonged or excessive exposure to direct and/or artificial sunlight while taking this medication.  Check with your doctor before becoming pregnant.  Do not take dairy products, antacids, or iron preparations within one hour of this medication.  Finish all this medication unless otherwise directed by prescriber.  Medication should be taken with plenty of water.    -- Indication: For Pyelonephritis    Multiple Vitamins oral tablet  -- 1 tab(s) by mouth once a day  -- Indication: For Prophylaxis

## 2017-12-27 NOTE — DISCHARGE NOTE ADULT - PLAN OF CARE
f/u outpatient with Urology for further urologic intervention; complete antibiotics for 14 days. c/w oral hydration and f/u with urology w/ in one week; complete oral antibiotics Ciprofloxacin for 14 days. f/u with your primary care doctor in 1-2 weeks.

## 2017-12-27 NOTE — DISCHARGE NOTE ADULT - CARE PLAN
Principal Discharge DX:	Pyelonephritis  Goal:	f/u outpatient with Urology for further urologic intervention; complete antibiotics for 14 days.  Instructions for follow-up, activity and diet:	c/w oral hydration and f/u with urology w/ in one week;  Secondary Diagnosis:	Bacteremia  Goal:	complete oral antibiotics Ciprofloxacin for 14 days.  Instructions for follow-up, activity and diet:	f/u with your primary care doctor in 1-2 weeks.

## 2017-12-27 NOTE — DISCHARGE NOTE ADULT - PATIENT PORTAL LINK FT
“You can access the [employer] Patient Portal, offered by [facility], by registering with the following website: [email or web address]”

## 2017-12-27 NOTE — DISCHARGE NOTE ADULT - HOSPITAL COURSE
Vital Signs Last 24 Hrs  T(C): 36.7 (27 Dec 2017 05:31), Max: 36.7 (26 Dec 2017 17:12)  T(F): 98 (27 Dec 2017 05:31), Max: 98.1 (26 Dec 2017 17:12)  HR: 68 (27 Dec 2017 05:31) (56 - 68)  BP: 158/85 (27 Dec 2017 05:31) (139/83 - 165/67)  BP(mean): --  RR: 18 (27 Dec 2017 05:31) (16 - 18)  SpO2: 99% (27 Dec 2017 05:31) (98% - 99%)    HEENT:   pupils equal and reactive, EOMI, no oropharyngeal lesions, erythema, exudates, oral thrush    NECK:   supple, no carotid bruits, no palpable lymph nodes, no thyromegaly    CV:  +S1, +S2, regular, no murmurs or rubs    RESP:   lungs clear to auscultation bilaterally, no wheezing, rales, rhonchi, good air entry bilaterally    BREAST:  not examined    GI:  abdomen soft, non-tender, non-distended, normal BS, no bruits, no abdominal masses, no palpable masses    RECTAL:  not examined    :  not examined    MSK:   normal muscle tone, no atrophy, no rigidity, no contractions    EXT:   no clubbing, no cyanosis, no edema, no calf pain, swelling or erythema    VASCULAR:  pulses equal and symmetric in the upper and lower extremities    NEURO:  AAOX3, no focal neurological deficits, follows all commands, able to move extremities spontaneously    SKIN:  no ulcers, lesions or rashes    27 Dec 2017 07:02    138    |  103    |  7      ----------------------------<  86     3.2     |  26     |  0.58     Ca    8.6        27 Dec 2017 07:02    CBC Full  -  ( 27 Dec 2017 07:02 )  WBC Count : 12.3 K/uL  Hemoglobin : 11.1 g/dL  Hematocrit : 33.3 %  Platelet Count - Automated : 149 K/uL  Mean Cell Volume : 93.2 fl  Mean Cell Hemoglobin : 31.1 pg  Mean Cell Hemoglobin Concentration : 33.4 gm/dL      Hospital course:  42 year old woman with HTN p/w left sided flank pain secondary to nephrolithiasis.  In ER, wbc ct 27, UA grossly positive, CT abd/pelvis  showed 0.4cm left sided kidney stone with L hydro and L perinephric fluid/stranding, was eval by urology and cystoscopy was performed and ureteral stent placed, blood cx/urine cx growing ecoli, pt given IV cipro, reports allergy to cephalosporins - had rash about 20 yrs ago.    Sepsis seecondary to Nephrolithiasis with UTI/pyelonephritis and bacteremia, ecoli  -profound leukocytosis improving : 30  >> 20.7   -0.4cm uterovesicular stone   -Uro consult -s/p cysto and left ureteral stent  -c/w IV abx, ID consult (pen allergic); cipro  -blood cultures and urine cultures growing : Ecoli 12/24   -repeat blood cultures sent today  -NPO  -IVFs  -pain managment Vital Signs Last 24 Hrs  T(C): 36.7 (27 Dec 2017 05:31), Max: 36.7 (26 Dec 2017 17:12)  T(F): 98 (27 Dec 2017 05:31), Max: 98.1 (26 Dec 2017 17:12)  HR: 68 (27 Dec 2017 05:31) (56 - 68)  BP: 158/85 (27 Dec 2017 05:31) (139/83 - 165/67)  BP(mean): --  RR: 18 (27 Dec 2017 05:31) (16 - 18)  SpO2: 99% (27 Dec 2017 05:31) (98% - 99%)    HEENT:   pupils equal and reactive, EOMI, no oropharyngeal lesions, erythema, exudates, oral thrush    NECK:   supple, no carotid bruits, no palpable lymph nodes, no thyromegaly    CV:  +S1, +S2, regular, no murmurs or rubs    RESP:   lungs clear to auscultation bilaterally, no wheezing, rales, rhonchi, good air entry bilaterally    BREAST:  not examined    GI:  abdomen soft, non-tender, non-distended, normal BS, no bruits, no abdominal masses, no palpable masses    RECTAL:  not examined    :  not examined    MSK:   normal muscle tone, no atrophy, no rigidity, no contractions    EXT:   no clubbing, no cyanosis, no edema, no calf pain, swelling or erythema    VASCULAR:  pulses equal and symmetric in the upper and lower extremities    NEURO:  AAOX3, no focal neurological deficits, follows all commands, able to move extremities spontaneously    SKIN:  no ulcers, lesions or rashes    27 Dec 2017 07:02    138    |  103    |  7      ----------------------------<  86     3.2     |  26     |  0.58     Ca    8.6        27 Dec 2017 07:02    CBC Full  -  ( 27 Dec 2017 07:02 )  WBC Count : 12.3 K/uL  Hemoglobin : 11.1 g/dL  Hematocrit : 33.3 %  Platelet Count - Automated : 149 K/uL  Mean Cell Volume : 93.2 fl  Mean Cell Hemoglobin : 31.1 pg  Mean Cell Hemoglobin Concentration : 33.4 gm/dL      Hospital course:  42 year old woman with HTN p/w left sided flank pain secondary to nephrolithiasis.  In ER, wbc ct 27, UA grossly positive, CT abd/pelvis  showed 0.4cm left sided kidney stone with L hydro and L perinephric fluid/stranding, was eval by urology and cystoscopy was performed and ureteral stent placed, blood cx/urine cx growing ecoli, pt given IV cipro, reports allergy to cephalosporins - had rash about 20 yrs ago.    Sepsis seecondary to Nephrolithiasis with UTI/pyelonephritis and bacteremia, ecoli  -profound leukocytosis improving : 30  >> 20.7 > 12; no temp spikes in over 48 hours  -0.4cm uterovesicular stone     -s/p cysto and left ureteral stent; needs to f/u outpatient with urology for likely lithotripsy and further uro interevention w/ in one week.   -pennicilin allergic; blood cultures and urine cultures growing : Ecoli 12/24 ; Has been on IV cipro; tolerating well.  -Is being discharged on 14 days of oral ciprofloxacin and to f/u with outpatient urology w/in one week

## 2017-12-31 LAB
CULTURE RESULTS: SIGNIFICANT CHANGE UP
CULTURE RESULTS: SIGNIFICANT CHANGE UP
SPECIMEN SOURCE: SIGNIFICANT CHANGE UP
SPECIMEN SOURCE: SIGNIFICANT CHANGE UP

## 2018-01-03 DIAGNOSIS — I10 ESSENTIAL (PRIMARY) HYPERTENSION: ICD-10-CM

## 2018-01-03 DIAGNOSIS — D72.829 ELEVATED WHITE BLOOD CELL COUNT, UNSPECIFIED: ICD-10-CM

## 2018-01-03 DIAGNOSIS — N17.9 ACUTE KIDNEY FAILURE, UNSPECIFIED: ICD-10-CM

## 2018-01-03 DIAGNOSIS — F17.200 NICOTINE DEPENDENCE, UNSPECIFIED, UNCOMPLICATED: ICD-10-CM

## 2018-01-03 DIAGNOSIS — N13.2 HYDRONEPHROSIS WITH RENAL AND URETERAL CALCULOUS OBSTRUCTION: ICD-10-CM

## 2018-01-03 DIAGNOSIS — A41.51 SEPSIS DUE TO ESCHERICHIA COLI [E. COLI]: ICD-10-CM

## 2018-01-03 DIAGNOSIS — N20.1 CALCULUS OF URETER: ICD-10-CM

## 2018-01-03 DIAGNOSIS — N12 TUBULO-INTERSTITIAL NEPHRITIS, NOT SPECIFIED AS ACUTE OR CHRONIC: ICD-10-CM

## 2018-01-09 ENCOUNTER — APPOINTMENT (OUTPATIENT)
Dept: UROLOGY | Facility: CLINIC | Age: 43
End: 2018-01-09

## 2018-01-09 ENCOUNTER — EMERGENCY (EMERGENCY)
Facility: HOSPITAL | Age: 43
LOS: 0 days | Discharge: HOME | End: 2018-01-09
Attending: EMERGENCY MEDICINE | Admitting: EMERGENCY MEDICINE
Payer: COMMERCIAL

## 2018-01-09 VITALS
HEART RATE: 78 BPM | SYSTOLIC BLOOD PRESSURE: 149 MMHG | OXYGEN SATURATION: 100 % | RESPIRATION RATE: 20 BRPM | DIASTOLIC BLOOD PRESSURE: 74 MMHG | TEMPERATURE: 99 F

## 2018-01-09 VITALS — WEIGHT: 139.99 LBS | HEIGHT: 61 IN

## 2018-01-09 DIAGNOSIS — N39.0 URINARY TRACT INFECTION, SITE NOT SPECIFIED: ICD-10-CM

## 2018-01-09 DIAGNOSIS — R31.9 HEMATURIA, UNSPECIFIED: ICD-10-CM

## 2018-01-09 DIAGNOSIS — R10.30 LOWER ABDOMINAL PAIN, UNSPECIFIED: ICD-10-CM

## 2018-01-09 DIAGNOSIS — Z98.891 HISTORY OF UTERINE SCAR FROM PREVIOUS SURGERY: Chronic | ICD-10-CM

## 2018-01-09 LAB
ALBUMIN SERPL ELPH-MCNC: 4.1 G/DL — SIGNIFICANT CHANGE UP (ref 3.3–5)
ALP SERPL-CCNC: 67 U/L — SIGNIFICANT CHANGE UP (ref 40–120)
ALT FLD-CCNC: 15 U/L — SIGNIFICANT CHANGE UP (ref 12–78)
ANION GAP SERPL CALC-SCNC: 7 MMOL/L — SIGNIFICANT CHANGE UP (ref 5–17)
APPEARANCE UR: CLEAR — SIGNIFICANT CHANGE UP
AST SERPL-CCNC: 7 U/L — LOW (ref 15–37)
BACTERIA # UR AUTO: (no result)
BASOPHILS # BLD AUTO: 0.1 K/UL — SIGNIFICANT CHANGE UP (ref 0–0.2)
BASOPHILS NFR BLD AUTO: 1 % — SIGNIFICANT CHANGE UP (ref 0–2)
BILIRUB SERPL-MCNC: 0.4 MG/DL — SIGNIFICANT CHANGE UP (ref 0.2–1.2)
BILIRUB UR-MCNC: NEGATIVE — SIGNIFICANT CHANGE UP
BUN SERPL-MCNC: 15 MG/DL — SIGNIFICANT CHANGE UP (ref 7–23)
CALCIUM SERPL-MCNC: 9 MG/DL — SIGNIFICANT CHANGE UP (ref 8.5–10.1)
CHLORIDE SERPL-SCNC: 106 MMOL/L — SIGNIFICANT CHANGE UP (ref 96–108)
CO2 SERPL-SCNC: 24 MMOL/L — SIGNIFICANT CHANGE UP (ref 22–31)
COLOR SPEC: YELLOW — SIGNIFICANT CHANGE UP
CREAT SERPL-MCNC: 0.8 MG/DL — SIGNIFICANT CHANGE UP (ref 0.5–1.3)
DIFF PNL FLD: (no result)
EOSINOPHIL # BLD AUTO: 0.1 K/UL — SIGNIFICANT CHANGE UP (ref 0–0.5)
EOSINOPHIL NFR BLD AUTO: 0.8 % — SIGNIFICANT CHANGE UP (ref 0–6)
EPI CELLS # UR: SIGNIFICANT CHANGE UP
GLUCOSE SERPL-MCNC: 98 MG/DL — SIGNIFICANT CHANGE UP (ref 70–99)
GLUCOSE UR QL: NEGATIVE MG/DL — SIGNIFICANT CHANGE UP
HCT VFR BLD CALC: 37.4 % — SIGNIFICANT CHANGE UP (ref 34.5–45)
HGB BLD-MCNC: 12.5 G/DL — SIGNIFICANT CHANGE UP (ref 11.5–15.5)
INR BLD: 1.09 RATIO — SIGNIFICANT CHANGE UP (ref 0.88–1.16)
KETONES UR-MCNC: (no result)
LEUKOCYTE ESTERASE UR-ACNC: (no result)
LYMPHOCYTES # BLD AUTO: 1.4 K/UL — SIGNIFICANT CHANGE UP (ref 1–3.3)
LYMPHOCYTES # BLD AUTO: 10 % — LOW (ref 13–44)
MCHC RBC-ENTMCNC: 30.7 PG — SIGNIFICANT CHANGE UP (ref 27–34)
MCHC RBC-ENTMCNC: 33.4 GM/DL — SIGNIFICANT CHANGE UP (ref 32–36)
MCV RBC AUTO: 92.1 FL — SIGNIFICANT CHANGE UP (ref 80–100)
MONOCYTES # BLD AUTO: 1.1 K/UL — HIGH (ref 0–0.9)
MONOCYTES NFR BLD AUTO: 7.6 % — SIGNIFICANT CHANGE UP (ref 2–14)
NEUTROPHILS # BLD AUTO: 11.2 K/UL — HIGH (ref 1.8–7.4)
NEUTROPHILS NFR BLD AUTO: 80.6 % — HIGH (ref 43–77)
NITRITE UR-MCNC: NEGATIVE — SIGNIFICANT CHANGE UP
PH UR: 6 — SIGNIFICANT CHANGE UP (ref 5–8)
PLATELET # BLD AUTO: 326 K/UL — SIGNIFICANT CHANGE UP (ref 150–400)
POTASSIUM SERPL-MCNC: 3.4 MMOL/L — LOW (ref 3.5–5.3)
POTASSIUM SERPL-SCNC: 3.4 MMOL/L — LOW (ref 3.5–5.3)
PROT SERPL-MCNC: 7.6 GM/DL — SIGNIFICANT CHANGE UP (ref 6–8.3)
PROT UR-MCNC: 30 MG/DL
PROTHROM AB SERPL-ACNC: 11.8 SEC — SIGNIFICANT CHANGE UP (ref 9.8–12.7)
RBC # BLD: 4.06 M/UL — SIGNIFICANT CHANGE UP (ref 3.8–5.2)
RBC # FLD: 11.5 % — SIGNIFICANT CHANGE UP (ref 10.3–14.5)
RBC CASTS # UR COMP ASSIST: >50 /HPF (ref 0–4)
SODIUM SERPL-SCNC: 137 MMOL/L — SIGNIFICANT CHANGE UP (ref 135–145)
SP GR SPEC: 1.01 — SIGNIFICANT CHANGE UP (ref 1.01–1.02)
UROBILINOGEN FLD QL: NEGATIVE MG/DL — SIGNIFICANT CHANGE UP
WBC # BLD: 13.9 K/UL — HIGH (ref 3.8–10.5)
WBC # FLD AUTO: 13.9 K/UL — HIGH (ref 3.8–10.5)
WBC UR QL: (no result)

## 2018-01-09 PROCEDURE — 99284 EMERGENCY DEPT VISIT MOD MDM: CPT

## 2018-01-09 PROCEDURE — 74018 RADEX ABDOMEN 1 VIEW: CPT | Mod: 26

## 2018-01-09 PROCEDURE — 76770 US EXAM ABDO BACK WALL COMP: CPT | Mod: 26

## 2018-01-09 RX ORDER — AZTREONAM 2 G
1 VIAL (EA) INJECTION
Qty: 20 | Refills: 0 | OUTPATIENT
Start: 2018-01-09 | End: 2018-01-18

## 2018-01-09 RX ORDER — SODIUM CHLORIDE 9 MG/ML
1000 INJECTION INTRAMUSCULAR; INTRAVENOUS; SUBCUTANEOUS ONCE
Qty: 0 | Refills: 0 | Status: COMPLETED | OUTPATIENT
Start: 2018-01-09 | End: 2018-01-09

## 2018-01-09 RX ORDER — MORPHINE SULFATE 50 MG/1
4 CAPSULE, EXTENDED RELEASE ORAL ONCE
Qty: 0 | Refills: 0 | Status: DISCONTINUED | OUTPATIENT
Start: 2018-01-09 | End: 2018-01-09

## 2018-01-09 RX ADMIN — MORPHINE SULFATE 4 MILLIGRAM(S): 50 CAPSULE, EXTENDED RELEASE ORAL at 13:30

## 2018-01-09 RX ADMIN — SODIUM CHLORIDE 1000 MILLILITER(S): 9 INJECTION INTRAMUSCULAR; INTRAVENOUS; SUBCUTANEOUS at 13:31

## 2018-01-09 NOTE — ED STATDOCS - PROGRESS NOTE DETAILS
43 yo female with a PMH of stent placement approximately 2 weeks ago for 4mm left ureter stone and admitted for pyelo and bacteremia presents with left flank and abd pain today. Was supposed to see Dr. Washington today for f/u but was having too much pain. -Taz Grayson PA-C Spoke with Dr. Moreno. States pt was supposed to see Dr. Washington today to schedule an appointment to remove the stent and stone. Mentioned if the pt's pain is under control he can send medication to her pharmacy to help with pain and she an see Dr. Washington in th office on friday in [place]. Spoke with pt, discussed plan, pain in under control and will wait for urine to come back. Will call the office tomorrow to set up the appointment. -Taz Grayson PA-C

## 2018-01-09 NOTE — ED STATDOCS - MEDICAL DECISION MAKING DETAILS
Pt admitted 2 weeks ago for kidney stone and pyelo, still with left flank pain.  Plan labs, urine, KUB, sonogram.

## 2018-01-09 NOTE — ED STATDOCS - OBJECTIVE STATEMENT
41 yo healthy female presents with left flank and abdominal pain today.  Pt was admitted here 2 weeks ago, found to have left nephrolithiasis with pyelo and bacteremia, given IV cipro, s/p cystocopy and ureteral stent and DC'd home on oral cipro, has follow-up appointment with Dr. Washington later today.  Took oxycodone at 9 am today without relief.

## 2018-01-09 NOTE — ED ADULT TRIAGE NOTE - CHIEF COMPLAINT QUOTE
left sided flank pain/pelvic pain. patient has a urologist appt today at 2:30. hx of utis/kidney stones.

## 2018-01-11 ENCOUNTER — APPOINTMENT (OUTPATIENT)
Dept: UROLOGY | Facility: CLINIC | Age: 43
End: 2018-01-11
Payer: COMMERCIAL

## 2018-01-11 VITALS
BODY MASS INDEX: 21.16 KG/M2 | WEIGHT: 115 LBS | SYSTOLIC BLOOD PRESSURE: 134 MMHG | TEMPERATURE: 97.8 F | DIASTOLIC BLOOD PRESSURE: 84 MMHG | OXYGEN SATURATION: 97 % | HEART RATE: 91 BPM | HEIGHT: 62 IN

## 2018-01-11 PROCEDURE — 99213 OFFICE O/P EST LOW 20 MIN: CPT

## 2018-01-12 LAB
APPEARANCE: ABNORMAL
BACTERIA: NEGATIVE
BILIRUBIN URINE: NEGATIVE
BLOOD URINE: ABNORMAL
COLOR: ABNORMAL
GLUCOSE QUALITATIVE U: NEGATIVE MG/DL
HYALINE CASTS: 0 /LPF
KETONES URINE: NEGATIVE
LEUKOCYTE ESTERASE URINE: ABNORMAL
MICROSCOPIC-UA: NORMAL
NITRITE URINE: NEGATIVE
PH URINE: 6.5
PROTEIN URINE: 100 MG/DL
RED BLOOD CELLS URINE: >720 /HPF
SPECIFIC GRAVITY URINE: 1.02
SQUAMOUS EPITHELIAL CELLS: 9 /HPF
UROBILINOGEN URINE: NEGATIVE MG/DL
WHITE BLOOD CELLS URINE: 34 /HPF

## 2018-01-18 LAB — BACTERIA UR CULT: NORMAL

## 2018-01-22 ENCOUNTER — FORM ENCOUNTER (OUTPATIENT)
Age: 43
End: 2018-01-22

## 2018-01-23 ENCOUNTER — APPOINTMENT (OUTPATIENT)
Dept: CT IMAGING | Facility: CLINIC | Age: 43
End: 2018-01-23
Payer: COMMERCIAL

## 2018-01-23 ENCOUNTER — OUTPATIENT (OUTPATIENT)
Dept: OUTPATIENT SERVICES | Facility: HOSPITAL | Age: 43
LOS: 1 days | End: 2018-01-23
Payer: COMMERCIAL

## 2018-01-23 DIAGNOSIS — Z00.8 ENCOUNTER FOR OTHER GENERAL EXAMINATION: ICD-10-CM

## 2018-01-23 DIAGNOSIS — Z98.891 HISTORY OF UTERINE SCAR FROM PREVIOUS SURGERY: Chronic | ICD-10-CM

## 2018-01-23 PROCEDURE — 74176 CT ABD & PELVIS W/O CONTRAST: CPT

## 2018-01-23 PROCEDURE — 74176 CT ABD & PELVIS W/O CONTRAST: CPT | Mod: 26

## 2018-01-25 RX ORDER — PSYLLIUM HUSK 0.4 G
CAPSULE ORAL
Refills: 0 | Status: ACTIVE | COMMUNITY

## 2018-01-25 RX ORDER — MULTIVITAMIN
TABLET ORAL
Refills: 0 | Status: ACTIVE | COMMUNITY

## 2018-02-05 RX ORDER — FAMOTIDINE 10 MG/ML
20 INJECTION INTRAVENOUS ONCE
Qty: 0 | Refills: 0 | Status: COMPLETED | OUTPATIENT
Start: 2018-02-06 | End: 2018-02-06

## 2018-02-05 RX ORDER — ACETAMINOPHEN 500 MG
975 TABLET ORAL ONCE
Qty: 0 | Refills: 0 | Status: COMPLETED | OUTPATIENT
Start: 2018-02-06 | End: 2018-02-06

## 2018-02-06 ENCOUNTER — APPOINTMENT (OUTPATIENT)
Dept: UROLOGY | Facility: HOSPITAL | Age: 43
End: 2018-02-06

## 2018-02-06 ENCOUNTER — OUTPATIENT (OUTPATIENT)
Dept: OUTPATIENT SERVICES | Facility: HOSPITAL | Age: 43
LOS: 1 days | Discharge: HOME | End: 2018-02-06
Payer: COMMERCIAL

## 2018-02-06 VITALS
TEMPERATURE: 98 F | HEIGHT: 62 IN | DIASTOLIC BLOOD PRESSURE: 75 MMHG | OXYGEN SATURATION: 100 % | SYSTOLIC BLOOD PRESSURE: 135 MMHG | HEART RATE: 76 BPM | RESPIRATION RATE: 16 BRPM | WEIGHT: 115.3 LBS

## 2018-02-06 VITALS
RESPIRATION RATE: 14 BRPM | OXYGEN SATURATION: 100 % | DIASTOLIC BLOOD PRESSURE: 69 MMHG | HEART RATE: 70 BPM | TEMPERATURE: 98 F | SYSTOLIC BLOOD PRESSURE: 153 MMHG

## 2018-02-06 DIAGNOSIS — Z98.891 HISTORY OF UTERINE SCAR FROM PREVIOUS SURGERY: Chronic | ICD-10-CM

## 2018-02-06 LAB
ANION GAP SERPL CALC-SCNC: 7 MMOL/L — SIGNIFICANT CHANGE UP (ref 5–17)
APPEARANCE UR: (no result)
APTT BLD: 30.1 SEC — SIGNIFICANT CHANGE UP (ref 27.5–37.4)
BACTERIA # UR AUTO: (no result)
BILIRUB UR-MCNC: NEGATIVE — SIGNIFICANT CHANGE UP
BLD GP AB SCN SERPL QL: SIGNIFICANT CHANGE UP
BUN SERPL-MCNC: 14 MG/DL — SIGNIFICANT CHANGE UP (ref 7–23)
CALCIUM SERPL-MCNC: 8.6 MG/DL — SIGNIFICANT CHANGE UP (ref 8.5–10.1)
CHLORIDE SERPL-SCNC: 109 MMOL/L — HIGH (ref 96–108)
CO2 SERPL-SCNC: 25 MMOL/L — SIGNIFICANT CHANGE UP (ref 22–31)
COLOR SPEC: YELLOW — SIGNIFICANT CHANGE UP
CREAT SERPL-MCNC: 0.58 MG/DL — SIGNIFICANT CHANGE UP (ref 0.5–1.3)
DIFF PNL FLD: (no result)
EPI CELLS # UR: SIGNIFICANT CHANGE UP
GLUCOSE SERPL-MCNC: 85 MG/DL — SIGNIFICANT CHANGE UP (ref 70–99)
GLUCOSE UR QL: NEGATIVE — SIGNIFICANT CHANGE UP
HCG UR QL: NEGATIVE — SIGNIFICANT CHANGE UP
HCT VFR BLD CALC: 37.2 % — SIGNIFICANT CHANGE UP (ref 34.5–45)
HGB BLD-MCNC: 12.4 G/DL — SIGNIFICANT CHANGE UP (ref 11.5–15.5)
INR BLD: 1.1 RATIO — SIGNIFICANT CHANGE UP (ref 0.88–1.16)
KETONES UR-MCNC: NEGATIVE — SIGNIFICANT CHANGE UP
LEUKOCYTE ESTERASE UR-ACNC: (no result)
MCHC RBC-ENTMCNC: 30.4 PG — SIGNIFICANT CHANGE UP (ref 27–34)
MCHC RBC-ENTMCNC: 33.4 GM/DL — SIGNIFICANT CHANGE UP (ref 32–36)
MCV RBC AUTO: 90.8 FL — SIGNIFICANT CHANGE UP (ref 80–100)
NITRITE UR-MCNC: NEGATIVE — SIGNIFICANT CHANGE UP
PH UR: 6 — SIGNIFICANT CHANGE UP (ref 5–8)
PLATELET # BLD AUTO: 226 K/UL — SIGNIFICANT CHANGE UP (ref 150–400)
POTASSIUM SERPL-MCNC: 3.3 MMOL/L — LOW (ref 3.5–5.3)
POTASSIUM SERPL-SCNC: 3.3 MMOL/L — LOW (ref 3.5–5.3)
PROT UR-MCNC: 100
PROTHROM AB SERPL-ACNC: 11.9 SEC — SIGNIFICANT CHANGE UP (ref 9.8–12.7)
RBC # BLD: 4.09 M/UL — SIGNIFICANT CHANGE UP (ref 3.8–5.2)
RBC # FLD: 11.5 % — SIGNIFICANT CHANGE UP (ref 10.3–14.5)
RBC CASTS # UR COMP ASSIST: >50 /HPF (ref 0–4)
SODIUM SERPL-SCNC: 141 MMOL/L — SIGNIFICANT CHANGE UP (ref 135–145)
SP GR SPEC: 1.01 — SIGNIFICANT CHANGE UP (ref 1.01–1.02)
TYPE + AB SCN PNL BLD: SIGNIFICANT CHANGE UP
UROBILINOGEN FLD QL: NEGATIVE — SIGNIFICANT CHANGE UP
WBC # BLD: 10.4 K/UL — SIGNIFICANT CHANGE UP (ref 3.8–10.5)
WBC # FLD AUTO: 10.4 K/UL — SIGNIFICANT CHANGE UP (ref 3.8–10.5)
WBC UR QL: (no result)

## 2018-02-06 PROCEDURE — 93010 ELECTROCARDIOGRAM REPORT: CPT

## 2018-02-06 PROCEDURE — 52351 CYSTOURETERO & OR PYELOSCOPE: CPT

## 2018-02-06 RX ORDER — FENTANYL CITRATE 50 UG/ML
50 INJECTION INTRAVENOUS
Qty: 0 | Refills: 0 | Status: DISCONTINUED | OUTPATIENT
Start: 2018-02-06 | End: 2018-02-06

## 2018-02-06 RX ORDER — HYDROMORPHONE HYDROCHLORIDE 2 MG/ML
1 INJECTION INTRAMUSCULAR; INTRAVENOUS; SUBCUTANEOUS EVERY 4 HOURS
Qty: 0 | Refills: 0 | Status: DISCONTINUED | OUTPATIENT
Start: 2018-02-06 | End: 2018-02-06

## 2018-02-06 RX ORDER — ONDANSETRON 8 MG/1
4 TABLET, FILM COATED ORAL ONCE
Qty: 0 | Refills: 0 | Status: DISCONTINUED | OUTPATIENT
Start: 2018-02-06 | End: 2018-02-06

## 2018-02-06 RX ORDER — OXYCODONE HYDROCHLORIDE 5 MG/1
5 TABLET ORAL EVERY 4 HOURS
Qty: 0 | Refills: 0 | Status: DISCONTINUED | OUTPATIENT
Start: 2018-02-06 | End: 2018-02-06

## 2018-02-06 RX ORDER — KETOROLAC TROMETHAMINE 30 MG/ML
30 SYRINGE (ML) INJECTION ONCE
Qty: 0 | Refills: 0 | Status: DISCONTINUED | OUTPATIENT
Start: 2018-02-06 | End: 2018-02-06

## 2018-02-06 RX ORDER — ONDANSETRON 8 MG/1
4 TABLET, FILM COATED ORAL EVERY 6 HOURS
Qty: 0 | Refills: 0 | Status: DISCONTINUED | OUTPATIENT
Start: 2018-02-06 | End: 2018-02-21

## 2018-02-06 RX ORDER — OXYBUTYNIN CHLORIDE 5 MG
5 TABLET ORAL THREE TIMES A DAY
Qty: 0 | Refills: 0 | Status: DISCONTINUED | OUTPATIENT
Start: 2018-02-06 | End: 2018-02-21

## 2018-02-06 RX ORDER — SODIUM CHLORIDE 9 MG/ML
1000 INJECTION, SOLUTION INTRAVENOUS
Qty: 0 | Refills: 0 | Status: DISCONTINUED | OUTPATIENT
Start: 2018-02-06 | End: 2018-02-06

## 2018-02-06 RX ORDER — OMEGA-3 ACID ETHYL ESTERS 1 G
1 CAPSULE ORAL
Qty: 0 | Refills: 0 | COMMUNITY

## 2018-02-06 RX ORDER — PHENAZOPYRIDINE HCL 100 MG
200 TABLET ORAL ONCE
Qty: 0 | Refills: 0 | Status: COMPLETED | OUTPATIENT
Start: 2018-02-06 | End: 2018-02-06

## 2018-02-06 RX ORDER — IBUPROFEN 200 MG
400 TABLET ORAL EVERY 8 HOURS
Qty: 0 | Refills: 0 | Status: DISCONTINUED | OUTPATIENT
Start: 2018-02-06 | End: 2018-02-21

## 2018-02-06 RX ADMIN — SODIUM CHLORIDE 100 MILLILITER(S): 9 INJECTION, SOLUTION INTRAVENOUS at 08:45

## 2018-02-06 RX ADMIN — Medication 975 MILLIGRAM(S): at 07:07

## 2018-02-06 RX ADMIN — FAMOTIDINE 20 MILLIGRAM(S): 10 INJECTION INTRAVENOUS at 07:07

## 2018-02-06 RX ADMIN — Medication 200 MILLIGRAM(S): at 08:47

## 2018-02-06 RX ADMIN — Medication 975 MILLIGRAM(S): at 10:58

## 2018-02-06 NOTE — BRIEF OPERATIVE NOTE - PROCEDURE
<<-----Click on this checkbox to enter Procedure Cystoscopy with left ureteroscopy and removal of ureteral stent  02/06/2018    Active  MARKOS

## 2018-02-06 NOTE — ASU DISCHARGE PLAN (ADULT/PEDIATRIC). - NOTIFY
Fever greater than 101/Unable to Urinate/Persistent Nausea and Vomiting/Pain not relieved by Medications

## 2018-02-06 NOTE — ASU DISCHARGE PLAN (ADULT/PEDIATRIC). - MEDICATION SUMMARY - MEDICATIONS TO TAKE
I will START or STAY ON the medications listed below when I get home from the hospital:    naproxen 500 mg oral tablet  -- 1 tab(s) by mouth 2 times a day, As Needed for menstrual pain  -- Indication: For previous home medication    SUMAtriptan 100 mg oral tablet  -- 1 tab(s) by mouth 2 times a day at least 2 hours between doses as needed  -- Indication: For previous home medication    Percocet 5/325 oral tablet  -- 1 tab(s) by mouth every 8 hours MDD:3  -- Caution federal law prohibits the transfer of this drug to any person other  than the person for whom it was prescribed.  May cause drowsiness.  Alcohol may intensify this effect.  Use care when operating dangerous machinery.  This prescription cannot be refilled.  This product contains acetaminophen.  Do not use  with any other product containing acetaminophen to prevent possible liver damage.  Using more of this medication than prescribed may cause serious breathing problems.    -- Indication: For previous home medication    Percocet 5/325 oral tablet  -- 1 tab(s) by mouth every 6 hours MDD:4  -- Caution federal law prohibits the transfer of this drug to any person other  than the person for whom it was prescribed.  May cause drowsiness.  Alcohol may intensify this effect.  Use care when operating dangerous machinery.  This prescription cannot be refilled.  This product contains acetaminophen.  Do not use  with any other product containing acetaminophen to prevent possible liver damage.  Using more of this medication than prescribed may cause serious breathing problems.    -- Indication: For previous home medication    atenolol 25 mg oral tablet  -- 1 tab(s) by mouth once a day  -- Indication: For previous home medication    Multiple Vitamins oral tablet  -- 1 tab(s) by mouth once a day  -- Indication: For previous home medication

## 2018-02-10 DIAGNOSIS — I10 ESSENTIAL (PRIMARY) HYPERTENSION: ICD-10-CM

## 2018-02-10 DIAGNOSIS — N20.1 CALCULUS OF URETER: ICD-10-CM

## 2018-02-10 DIAGNOSIS — F17.200 NICOTINE DEPENDENCE, UNSPECIFIED, UNCOMPLICATED: ICD-10-CM

## 2018-02-10 DIAGNOSIS — F12.99 CANNABIS USE, UNSPECIFIED WITH UNSPECIFIED CANNABIS-INDUCED DISORDER: ICD-10-CM

## 2018-02-10 DIAGNOSIS — N21.0 CALCULUS IN BLADDER: ICD-10-CM

## 2018-02-13 ENCOUNTER — APPOINTMENT (OUTPATIENT)
Dept: UROLOGY | Facility: CLINIC | Age: 43
End: 2018-02-13
Payer: COMMERCIAL

## 2018-02-13 PROCEDURE — 99213 OFFICE O/P EST LOW 20 MIN: CPT

## 2018-02-14 RX ORDER — KETOCONAZOLE 20.5 MG/ML
2 SHAMPOO, SUSPENSION TOPICAL
Qty: 1 | Refills: 6 | Status: COMPLETED | COMMUNITY
Start: 2017-05-04 | End: 2018-02-14

## 2018-02-14 RX ORDER — OXYCODONE 5 MG/1
5 TABLET ORAL
Qty: 30 | Refills: 0 | Status: COMPLETED | COMMUNITY
Start: 2018-01-23 | End: 2018-02-14

## 2018-02-14 RX ORDER — ATENOLOL 50 MG/1
50 TABLET ORAL
Refills: 0 | Status: COMPLETED | COMMUNITY
End: 2018-02-14

## 2018-02-14 RX ORDER — NAPROXEN 500 MG/1
TABLET ORAL
Refills: 0 | Status: COMPLETED | COMMUNITY
End: 2018-02-14

## 2018-03-22 ENCOUNTER — APPOINTMENT (OUTPATIENT)
Dept: RADIOLOGY | Facility: CLINIC | Age: 43
End: 2018-03-22
Payer: COMMERCIAL

## 2018-03-22 ENCOUNTER — OUTPATIENT (OUTPATIENT)
Dept: OUTPATIENT SERVICES | Facility: HOSPITAL | Age: 43
LOS: 1 days | End: 2018-03-22
Payer: COMMERCIAL

## 2018-03-22 ENCOUNTER — APPOINTMENT (OUTPATIENT)
Dept: ULTRASOUND IMAGING | Facility: CLINIC | Age: 43
End: 2018-03-22
Payer: COMMERCIAL

## 2018-03-22 DIAGNOSIS — Z00.8 ENCOUNTER FOR OTHER GENERAL EXAMINATION: ICD-10-CM

## 2018-03-22 DIAGNOSIS — Z98.891 HISTORY OF UTERINE SCAR FROM PREVIOUS SURGERY: Chronic | ICD-10-CM

## 2018-03-22 PROCEDURE — 74018 RADEX ABDOMEN 1 VIEW: CPT | Mod: 26

## 2018-03-22 PROCEDURE — 76775 US EXAM ABDO BACK WALL LIM: CPT | Mod: 26

## 2018-03-22 PROCEDURE — 74018 RADEX ABDOMEN 1 VIEW: CPT

## 2018-03-22 PROCEDURE — 76775 US EXAM ABDO BACK WALL LIM: CPT

## 2018-03-29 ENCOUNTER — APPOINTMENT (OUTPATIENT)
Dept: UROLOGY | Facility: CLINIC | Age: 43
End: 2018-03-29
Payer: COMMERCIAL

## 2018-03-29 PROCEDURE — 99213 OFFICE O/P EST LOW 20 MIN: CPT

## 2018-07-18 ENCOUNTER — LABORATORY RESULT (OUTPATIENT)
Age: 43
End: 2018-07-18

## 2018-07-22 PROBLEM — Z78.9 ALCOHOL USE: Status: ACTIVE | Noted: 2017-05-04

## 2018-07-24 ENCOUNTER — APPOINTMENT (OUTPATIENT)
Dept: UROLOGY | Facility: CLINIC | Age: 43
End: 2018-07-24
Payer: COMMERCIAL

## 2018-07-24 DIAGNOSIS — N20.1 CALCULUS OF URETER: ICD-10-CM

## 2018-07-24 DIAGNOSIS — R82.99 OTHER ABNORMAL FINDINGS IN URINE: ICD-10-CM

## 2018-07-24 PROCEDURE — 99213 OFFICE O/P EST LOW 20 MIN: CPT

## 2018-11-05 PROBLEM — I10 ESSENTIAL (PRIMARY) HYPERTENSION: Chronic | Status: ACTIVE | Noted: 2017-12-24

## 2018-11-19 ENCOUNTER — APPOINTMENT (OUTPATIENT)
Dept: OBGYN | Facility: CLINIC | Age: 43
End: 2018-11-19
Payer: COMMERCIAL

## 2018-11-19 VITALS
WEIGHT: 110 LBS | DIASTOLIC BLOOD PRESSURE: 80 MMHG | BODY MASS INDEX: 20.24 KG/M2 | TEMPERATURE: 98 F | HEIGHT: 62 IN | SYSTOLIC BLOOD PRESSURE: 120 MMHG

## 2018-11-19 DIAGNOSIS — Z01.419 ENCOUNTER FOR GYNECOLOGICAL EXAMINATION (GENERAL) (ROUTINE) W/OUT ABNORMAL FINDINGS: ICD-10-CM

## 2018-11-19 DIAGNOSIS — Z78.9 OTHER SPECIFIED HEALTH STATUS: ICD-10-CM

## 2018-11-19 DIAGNOSIS — Z87.442 PERSONAL HISTORY OF URINARY CALCULI: ICD-10-CM

## 2018-11-19 DIAGNOSIS — Z11.3 ENCOUNTER FOR SCREENING FOR INFECTIONS WITH A PREDOMINANTLY SEXUAL MODE OF TRANSMISSION: ICD-10-CM

## 2018-11-19 PROCEDURE — 99396 PREV VISIT EST AGE 40-64: CPT

## 2018-11-26 LAB
C TRACH RRNA SPEC QL NAA+PROBE: NOT DETECTED
CYTOLOGY CVX/VAG DOC THIN PREP: NORMAL
N GONORRHOEA RRNA SPEC QL NAA+PROBE: NOT DETECTED
SOURCE TP AMPLIFICATION: NORMAL

## 2019-02-20 NOTE — DISCHARGE NOTE ADULT - IF YOU ARE A SMOKER, IT IS IMPORTANT FOR YOUR HEALTH TO STOP SMOKING. PLEASE BE AWARE THAT SECOND HAND SMOKE IS ALSO HARMFUL.
This was a polymicrobial bacteremia with possible intra-abdominal or enteric source  CT imaging of the abdomen and pelvis did not reveal a definitive source although did suggest pyelonephritis, but urinalysis and culture studies were nondiagnostic  Transthoracic echocardiogram was negative for valvular vegetation  Bacteremia has cleared and patient has completed 10 days of IV antibiotics  Statement Selected

## 2019-04-09 ENCOUNTER — MEDICATION RENEWAL (OUTPATIENT)
Age: 44
End: 2019-04-09

## 2019-09-26 NOTE — PATIENT PROFILE ADULT. - MENTAL HEALTH CONDITIONS/SYMPTOMS, PROFILE
Patient:   JESSICA MILLER            MRN: [id number]            FIN: [id number]               Age:   32 years     Sex:  MALE     :  85   Associated Diagnoses:   None   Author:   CHELITA WILSON        Behavioral Health Discharge Summary      Brief Description/Reason for Admission:   32 year old man with history of schizophrenia was brought to the ED by [facility] for \"bizarre behavior.\" Substance use notable for alcohol and marijuana.     Discharging Diagnosis:   Schizophenia    Justification for Multiple Antipsychotics at Discharge: (Select answer and if applicable type medications)  x Not Applicable as patient is receiving zero or one antipsychotic medications at       discharge    Three or more failed trials of Monotherapy with: _    Plan to taper Monotherapy or Cross Taper in progress with: _    Augmentation of Clozapine with: _     Was a Prescription for an FDA- approved tobacco cessation medication given to the patient at discharge?    Yes, a prescription for an FDA-approved tobacco cessation medication was given to the patient at discharge.    No, not applicable. Patient has not used tobacco within the past 30 days prior to the day of hospital admission or the patient smokes 4 or less cigarettes daily.    No, patient is to continue on OTC NRT   x No, a prescription for an FDA-approved tobacco cessation medication was offered at discharge, but the patient refused.    No, a prescription for an FDA-approved tobacco cessation medication was not given to the patient because : _     Was a prescription for an FDA-approved alcohol or drug disorder medication was given to the patient at discharge?     No, not applicable since the patient does not have a diagnosis of alcohol or drug disorder and does not have unhealthy alcohol use   x No, a prescription for an FDA-approved alcohol or drug disorder medication was offered at discharge, but the patient refused    Yes, Acamprosate-Campral    Yes,  Antabuse-Disulfiram    Yes, Naltrexone-Revia Oral    Yes, Buprenorphine-Suboxone    Yes, Methadone-Vivitrol Injection               Electronically Signed On 09/10/2018 11:18  __________________________________________________   CHELITA WILSON     none

## 2019-10-11 NOTE — ED ADULT NURSE NOTE - PMH
Observation/Bedded Outpatient  Occupational Therapy Plan of Care Note  Primary Insurance: MEDICARE    Secondary Insurance: ANTHEM/BCBS    Note sent for required physician co-signature: Yes         Is the patient currently receiving skilled home care services (RN or therapy)?No           Diagnosis: No diagnosis found.    Therapy Diagnosis: Weakness    Assessment:  Patient's overall level of function is near baseline which was independent with mobility and ADL. Patient DOS s/p right TKA. Patient lives alone in a mobile home, reports her son will be staying with her for a day or two. Patient able to complete lower body dressing, toileting, grooming in stance at sink and functional mobility/transfers with modified independence using 2ww. Patient educated on safe item retrieval and transport using 2ww. Patient has no concerns for ADL at home and has no further skilled OT needs at this time. OT to sign off.     Co morbidities:   Patient Active Problem List   Diagnosis   • Type II or unspecified type diabetes mellitus without mention of complication, not stated as uncontrolled   • Other and unspecified hyperlipidemia   • Wears hearing aid   • Trigger middle finger of left hand   • Age-related osteoporosis without current pathological fracture   • Gastroesophageal reflux disease without esophagitis   • Mild intermittent asthma without complication   • Pulmonary nodules   • Abnormal CXR (chest x-ray)   • Nonsmoker   • Primary osteoarthritis of right knee   • Status post total left knee replacement   • Status post total right knee replacement       OT Task Modification: Minimal to moderate task modification    Precautions: Precautions  Weight Bearing Status: Weight bearing as tolerated right lower extremity    Functional Status: (as of date/time noted)  Self Cares/ Activities of daily living:  Grooming Assistance: Modified independent;Standing at sink (10/11/19 6311)  Lower Body Clothing Assistance: Modified independent  (10/11/19 1441)  Footwear Assistance: Modified independent (10/11/19 1441)  Toileting Assistance: Modified independent (10/11/19 1441)    Household Mobility:  Supine to Sit: Modified Independent (10/11/19 1441)  Sit to Supine: Modified Independent (10/11/19 1441)  Sit to Stand: Modified Independent (10/11/19 1441)  Stand to Sit: Modified Independent (10/11/19 1441)  Stand Pivot Transfers: Modified Independent (10/11/19 1441)  Toilet Transfers: Modified Independent (10/11/19 1441)  Transfer Equipment: 2ww (10/11/19 1441)  Sitting - Static: Independent (10/11/19 1441)  Sitting - Dynamic: Independent (10/11/19 1441)  Standing - Static: Modified Independent (10/11/19 1441)  Standing - Dynamic: Modified Independent (10/11/19 1441)    Home Management Skills:       See OT (occupational therapy) flowsheet for full details regarding the OT provided.    Education: On this date, the patient was educated on role of OT, safety.   The response to education was: Verbalizes understanding and Demonstrates understanding    Barriers to Discharge:      Long Term Treatment Goals:  Feeding Discharge Goal:    Grooming Discharge Goal:    Bathing Discharge Goal:    Dressing Discharge Goal:    Toileting Discharge Goal:    Home Setting Transfer Discharge Goal:    Home Management Discharge Goal:    Upper Extremity Function Discharge Goal:    Other Discharge Goal:    Other Discharge Goal:      Treatment Interventions:    Amount:  0-30 minutes  Frequency:     Duration: D/C OT    Plan for Next Session:      Recommendations for Discharge: Home    Billing Information:    Timed Procedures:   $ ADL/Self Care : 8-22 mins,  ,  ,  ,  ,  ,  ,  ,  ,  ,  ,  ,  ,  ,  ,      Untimed Procedures:   ,  ,  ,  ,  ,  ,  , $ OT Eval: Moderate Complexity: 1 Procedure,      Total Treatment Time:  OT Time Spent: 25 minutes    Timed Treatment Minutes:  OBS Patients Only:  OT Timed Code TX Minutes: 15 minutes    The co-signature indicates that the physician certifies  the need for OT furnished under this plan of treatment while under his/her care.   Essential hypertension    Menorrhalgia

## 2019-11-21 ENCOUNTER — APPOINTMENT (OUTPATIENT)
Dept: OBGYN | Facility: CLINIC | Age: 44
End: 2019-11-21
Payer: COMMERCIAL

## 2019-11-21 VITALS
DIASTOLIC BLOOD PRESSURE: 80 MMHG | HEIGHT: 62 IN | BODY MASS INDEX: 21.71 KG/M2 | SYSTOLIC BLOOD PRESSURE: 115 MMHG | WEIGHT: 118 LBS

## 2019-11-21 DIAGNOSIS — Z12.31 ENCOUNTER FOR SCREENING MAMMOGRAM FOR MALIGNANT NEOPLASM OF BREAST: ICD-10-CM

## 2019-11-21 DIAGNOSIS — R31.9 HEMATURIA, UNSPECIFIED: ICD-10-CM

## 2019-11-21 PROCEDURE — 99396 PREV VISIT EST AGE 40-64: CPT

## 2019-11-25 LAB — BACTERIA UR CULT: NORMAL

## 2020-03-02 DIAGNOSIS — R92.8 OTHER ABNORMAL AND INCONCLUSIVE FINDINGS ON DIAGNOSTIC IMAGING OF BREAST: ICD-10-CM

## 2020-06-27 ENCOUNTER — TRANSCRIPTION ENCOUNTER (OUTPATIENT)
Age: 45
End: 2020-06-27

## 2020-06-27 ENCOUNTER — INPATIENT (INPATIENT)
Facility: HOSPITAL | Age: 45
LOS: 1 days | Discharge: HOME | DRG: 282 | End: 2020-06-29
Attending: EMERGENCY MEDICINE | Admitting: EMERGENCY MEDICINE
Payer: COMMERCIAL

## 2020-06-27 VITALS — HEIGHT: 62 IN | WEIGHT: 126.99 LBS

## 2020-06-27 DIAGNOSIS — Z98.891 HISTORY OF UTERINE SCAR FROM PREVIOUS SURGERY: Chronic | ICD-10-CM

## 2020-06-27 DIAGNOSIS — I21.4 NON-ST ELEVATION (NSTEMI) MYOCARDIAL INFARCTION: ICD-10-CM

## 2020-06-27 DIAGNOSIS — Z96.0 PRESENCE OF UROGENITAL IMPLANTS: Chronic | ICD-10-CM

## 2020-06-27 LAB
ALBUMIN SERPL ELPH-MCNC: 3.8 G/DL — SIGNIFICANT CHANGE UP (ref 3.3–5)
ALP SERPL-CCNC: 90 U/L — SIGNIFICANT CHANGE UP (ref 40–120)
ALT FLD-CCNC: 25 U/L — SIGNIFICANT CHANGE UP (ref 12–78)
ANION GAP SERPL CALC-SCNC: 9 MMOL/L — SIGNIFICANT CHANGE UP (ref 5–17)
APPEARANCE UR: CLEAR — SIGNIFICANT CHANGE UP
AST SERPL-CCNC: 26 U/L — SIGNIFICANT CHANGE UP (ref 15–37)
BASOPHILS # BLD AUTO: 0.08 K/UL — SIGNIFICANT CHANGE UP (ref 0–0.2)
BASOPHILS NFR BLD AUTO: 0.4 % — SIGNIFICANT CHANGE UP (ref 0–2)
BILIRUB SERPL-MCNC: 0.5 MG/DL — SIGNIFICANT CHANGE UP (ref 0.2–1.2)
BILIRUB UR-MCNC: NEGATIVE — SIGNIFICANT CHANGE UP
BUN SERPL-MCNC: 10 MG/DL — SIGNIFICANT CHANGE UP (ref 7–23)
CALCIUM SERPL-MCNC: 9.2 MG/DL — SIGNIFICANT CHANGE UP (ref 8.5–10.1)
CHLORIDE SERPL-SCNC: 109 MMOL/L — HIGH (ref 96–108)
CO2 SERPL-SCNC: 21 MMOL/L — LOW (ref 22–31)
COLOR SPEC: YELLOW — SIGNIFICANT CHANGE UP
CREAT SERPL-MCNC: 0.77 MG/DL — SIGNIFICANT CHANGE UP (ref 0.5–1.3)
D DIMER BLD IA.RAPID-MCNC: <150 NG/ML DDU — SIGNIFICANT CHANGE UP
DIFF PNL FLD: ABNORMAL
EOSINOPHIL # BLD AUTO: 0.09 K/UL — SIGNIFICANT CHANGE UP (ref 0–0.5)
EOSINOPHIL NFR BLD AUTO: 0.5 % — SIGNIFICANT CHANGE UP (ref 0–6)
GLUCOSE SERPL-MCNC: 100 MG/DL — HIGH (ref 70–99)
GLUCOSE UR QL: NEGATIVE MG/DL — SIGNIFICANT CHANGE UP
HCG SERPL-ACNC: <1 MIU/ML — SIGNIFICANT CHANGE UP
HCT VFR BLD CALC: 36.4 % — SIGNIFICANT CHANGE UP (ref 34.5–45)
HCT VFR BLD CALC: 39.3 % — SIGNIFICANT CHANGE UP (ref 34.5–45)
HGB BLD-MCNC: 12.3 G/DL — SIGNIFICANT CHANGE UP (ref 11.5–15.5)
HGB BLD-MCNC: 13.4 G/DL — SIGNIFICANT CHANGE UP (ref 11.5–15.5)
IMM GRANULOCYTES NFR BLD AUTO: 0.4 % — SIGNIFICANT CHANGE UP (ref 0–1.5)
KETONES UR-MCNC: ABNORMAL
LEUKOCYTE ESTERASE UR-ACNC: NEGATIVE — SIGNIFICANT CHANGE UP
LYMPHOCYTES # BLD AUTO: 17.1 % — SIGNIFICANT CHANGE UP (ref 13–44)
LYMPHOCYTES # BLD AUTO: 3.23 K/UL — SIGNIFICANT CHANGE UP (ref 1–3.3)
MAGNESIUM SERPL-MCNC: 1.8 MG/DL — SIGNIFICANT CHANGE UP (ref 1.6–2.6)
MCHC RBC-ENTMCNC: 30.6 PG — SIGNIFICANT CHANGE UP (ref 27–34)
MCHC RBC-ENTMCNC: 30.9 PG — SIGNIFICANT CHANGE UP (ref 27–34)
MCHC RBC-ENTMCNC: 33.8 GM/DL — SIGNIFICANT CHANGE UP (ref 32–36)
MCHC RBC-ENTMCNC: 34.1 GM/DL — SIGNIFICANT CHANGE UP (ref 32–36)
MCV RBC AUTO: 90.5 FL — SIGNIFICANT CHANGE UP (ref 80–100)
MCV RBC AUTO: 90.6 FL — SIGNIFICANT CHANGE UP (ref 80–100)
MONOCYTES # BLD AUTO: 1.6 K/UL — HIGH (ref 0–0.9)
MONOCYTES NFR BLD AUTO: 8.5 % — SIGNIFICANT CHANGE UP (ref 2–14)
NEUTROPHILS # BLD AUTO: 13.85 K/UL — HIGH (ref 1.8–7.4)
NEUTROPHILS NFR BLD AUTO: 73.1 % — SIGNIFICANT CHANGE UP (ref 43–77)
NITRITE UR-MCNC: NEGATIVE — SIGNIFICANT CHANGE UP
PH UR: 6 — SIGNIFICANT CHANGE UP (ref 5–8)
PLATELET # BLD AUTO: 209 K/UL — SIGNIFICANT CHANGE UP (ref 150–400)
PLATELET # BLD AUTO: 232 K/UL — SIGNIFICANT CHANGE UP (ref 150–400)
POTASSIUM SERPL-MCNC: 4 MMOL/L — SIGNIFICANT CHANGE UP (ref 3.5–5.3)
POTASSIUM SERPL-SCNC: 4 MMOL/L — SIGNIFICANT CHANGE UP (ref 3.5–5.3)
PROT SERPL-MCNC: 7.6 GM/DL — SIGNIFICANT CHANGE UP (ref 6–8.3)
PROT UR-MCNC: NEGATIVE MG/DL — SIGNIFICANT CHANGE UP
RBC # BLD: 4.02 M/UL — SIGNIFICANT CHANGE UP (ref 3.8–5.2)
RBC # BLD: 4.34 M/UL — SIGNIFICANT CHANGE UP (ref 3.8–5.2)
RBC # FLD: 12.2 % — SIGNIFICANT CHANGE UP (ref 10.3–14.5)
RBC # FLD: 12.3 % — SIGNIFICANT CHANGE UP (ref 10.3–14.5)
SARS-COV-2 RNA SPEC QL NAA+PROBE: SIGNIFICANT CHANGE UP
SODIUM SERPL-SCNC: 139 MMOL/L — SIGNIFICANT CHANGE UP (ref 135–145)
SP GR SPEC: 1.01 — SIGNIFICANT CHANGE UP (ref 1.01–1.02)
TROPONIN I SERPL-MCNC: 1.25 NG/ML — HIGH (ref 0.01–0.04)
TROPONIN I SERPL-MCNC: 2.31 NG/ML — HIGH (ref 0.01–0.04)
TROPONIN I SERPL-MCNC: 2.64 NG/ML — HIGH (ref 0.01–0.04)
UROBILINOGEN FLD QL: NEGATIVE MG/DL — SIGNIFICANT CHANGE UP
WBC # BLD: 18.07 K/UL — HIGH (ref 3.8–10.5)
WBC # BLD: 18.92 K/UL — HIGH (ref 3.8–10.5)
WBC # FLD AUTO: 18.07 K/UL — HIGH (ref 3.8–10.5)
WBC # FLD AUTO: 18.92 K/UL — HIGH (ref 3.8–10.5)

## 2020-06-27 PROCEDURE — 85730 THROMBOPLASTIN TIME PARTIAL: CPT

## 2020-06-27 PROCEDURE — C1769: CPT

## 2020-06-27 PROCEDURE — 93010 ELECTROCARDIOGRAM REPORT: CPT

## 2020-06-27 PROCEDURE — 93005 ELECTROCARDIOGRAM TRACING: CPT

## 2020-06-27 PROCEDURE — C1887: CPT

## 2020-06-27 PROCEDURE — 36415 COLL VENOUS BLD VENIPUNCTURE: CPT

## 2020-06-27 PROCEDURE — 84484 ASSAY OF TROPONIN QUANT: CPT

## 2020-06-27 PROCEDURE — 99223 1ST HOSP IP/OBS HIGH 75: CPT | Mod: AI

## 2020-06-27 PROCEDURE — 87086 URINE CULTURE/COLONY COUNT: CPT

## 2020-06-27 PROCEDURE — 84100 ASSAY OF PHOSPHORUS: CPT

## 2020-06-27 PROCEDURE — 85027 COMPLETE CBC AUTOMATED: CPT

## 2020-06-27 PROCEDURE — 93306 TTE W/DOPPLER COMPLETE: CPT

## 2020-06-27 PROCEDURE — C1894: CPT

## 2020-06-27 PROCEDURE — 80048 BASIC METABOLIC PNL TOTAL CA: CPT

## 2020-06-27 PROCEDURE — 81001 URINALYSIS AUTO W/SCOPE: CPT

## 2020-06-27 PROCEDURE — 86769 SARS-COV-2 COVID-19 ANTIBODY: CPT

## 2020-06-27 PROCEDURE — 71046 X-RAY EXAM CHEST 2 VIEWS: CPT | Mod: 26

## 2020-06-27 PROCEDURE — 83735 ASSAY OF MAGNESIUM: CPT

## 2020-06-27 RX ORDER — MORPHINE SULFATE 50 MG/1
2 CAPSULE, EXTENDED RELEASE ORAL EVERY 6 HOURS
Refills: 0 | Status: DISCONTINUED | OUTPATIENT
Start: 2020-06-27 | End: 2020-06-28

## 2020-06-27 RX ORDER — HEPARIN SODIUM 5000 [USP'U]/ML
3500 INJECTION INTRAVENOUS; SUBCUTANEOUS ONCE
Refills: 0 | Status: COMPLETED | OUTPATIENT
Start: 2020-06-27 | End: 2020-06-27

## 2020-06-27 RX ORDER — ASPIRIN/CALCIUM CARB/MAGNESIUM 324 MG
81 TABLET ORAL DAILY
Refills: 0 | Status: DISCONTINUED | OUTPATIENT
Start: 2020-06-28 | End: 2020-06-29

## 2020-06-27 RX ORDER — ATENOLOL 25 MG/1
25 TABLET ORAL DAILY
Refills: 0 | Status: DISCONTINUED | OUTPATIENT
Start: 2020-06-27 | End: 2020-06-29

## 2020-06-27 RX ORDER — HEPARIN SODIUM 5000 [USP'U]/ML
INJECTION INTRAVENOUS; SUBCUTANEOUS
Qty: 25000 | Refills: 0 | Status: DISCONTINUED | OUTPATIENT
Start: 2020-06-27 | End: 2020-06-29

## 2020-06-27 RX ORDER — HEPARIN SODIUM 5000 [USP'U]/ML
3500 INJECTION INTRAVENOUS; SUBCUTANEOUS EVERY 6 HOURS
Refills: 0 | Status: DISCONTINUED | OUTPATIENT
Start: 2020-06-27 | End: 2020-06-29

## 2020-06-27 RX ORDER — ACETAMINOPHEN 500 MG
650 TABLET ORAL EVERY 6 HOURS
Refills: 0 | Status: DISCONTINUED | OUTPATIENT
Start: 2020-06-27 | End: 2020-06-29

## 2020-06-27 RX ORDER — ONDANSETRON 8 MG/1
4 TABLET, FILM COATED ORAL ONCE
Refills: 0 | Status: COMPLETED | OUTPATIENT
Start: 2020-06-27 | End: 2020-06-27

## 2020-06-27 RX ORDER — ATORVASTATIN CALCIUM 80 MG/1
80 TABLET, FILM COATED ORAL AT BEDTIME
Refills: 0 | Status: DISCONTINUED | OUTPATIENT
Start: 2020-06-27 | End: 2020-06-29

## 2020-06-27 RX ORDER — SUMATRIPTAN SUCCINATE 4 MG/.5ML
100 INJECTION, SOLUTION SUBCUTANEOUS
Refills: 0 | Status: DISCONTINUED | OUTPATIENT
Start: 2020-06-27 | End: 2020-06-27

## 2020-06-27 RX ORDER — ASPIRIN/CALCIUM CARB/MAGNESIUM 324 MG
325 TABLET ORAL ONCE
Refills: 0 | Status: COMPLETED | OUTPATIENT
Start: 2020-06-27 | End: 2020-06-27

## 2020-06-27 RX ORDER — CLOPIDOGREL BISULFATE 75 MG/1
75 TABLET, FILM COATED ORAL DAILY
Refills: 0 | Status: DISCONTINUED | OUTPATIENT
Start: 2020-06-28 | End: 2020-06-29

## 2020-06-27 RX ORDER — CLOPIDOGREL BISULFATE 75 MG/1
300 TABLET, FILM COATED ORAL ONCE
Refills: 0 | Status: COMPLETED | OUTPATIENT
Start: 2020-06-27 | End: 2020-06-27

## 2020-06-27 RX ADMIN — HEPARIN SODIUM 3500 UNIT(S): 5000 INJECTION INTRAVENOUS; SUBCUTANEOUS at 18:00

## 2020-06-27 RX ADMIN — Medication 325 MILLIGRAM(S): at 16:35

## 2020-06-27 RX ADMIN — HEPARIN SODIUM 700 UNIT(S)/HR: 5000 INJECTION INTRAVENOUS; SUBCUTANEOUS at 18:02

## 2020-06-27 RX ADMIN — ATORVASTATIN CALCIUM 80 MILLIGRAM(S): 80 TABLET, FILM COATED ORAL at 21:08

## 2020-06-27 RX ADMIN — ONDANSETRON 4 MILLIGRAM(S): 8 TABLET, FILM COATED ORAL at 18:25

## 2020-06-27 RX ADMIN — Medication 2.5 MILLIGRAM(S): at 21:08

## 2020-06-27 RX ADMIN — CLOPIDOGREL BISULFATE 300 MILLIGRAM(S): 75 TABLET, FILM COATED ORAL at 18:03

## 2020-06-27 NOTE — H&P ADULT - NSICDXPASTSURGICALHX_GEN_ALL_CORE_FT
PAST SURGICAL HISTORY:  History of      Status post placement of ureteral stent H/o stent for kidney stone

## 2020-06-27 NOTE — ED ADULT NURSE NOTE - OBJECTIVE STATEMENT
patient states she had cp at 330 a that lasted for approx 10 minutes with numbness in bilateral arms.  Denies N/V or diaphoresis

## 2020-06-27 NOTE — ED STATDOCS - PHYSICAL EXAMINATION
PA NOTE: GEN: AOX3, NAD. HEENT: Throat clear. Airway is patent. EYES: PERRLA. EOMI. Head: NC/AT. NECK: Supple, No JVD. FROM. C-spine non-tender. CV:S1S2, RRR, LUNGS: Non-labored breathing, no tachypnea. O2sat 100% RA. CTA b/l. No w/r/r. CHEST: Equal chest expansion and rise. No deformity. ABD: Soft, NT/ND, no rebound, no guarding. No CVAT. EXT: No e/c/c. 2+ distal pulses. SKIN: No rashes. NEURO: No focal deficits. CN II-XII intact. FROM. 5/5 motor and sensory. ~Jean Ferreira PA-C

## 2020-06-27 NOTE — ED STATDOCS - OBJECTIVE STATEMENT
44 YOF PMHx HTN, renal calculi presents to the ED c/o 5-10 minute episode of CP at 03:30 this morning. Pt fell asleep on the couch last night, awoke at 03:30 to move to her bed when she had a migraine and "horrible," pressure-like pain in her chest. Of note both of pt's arms were numb during this episode of pain. CP currently resolved, pt adds she took a migraine medication after episode of pain. Does not follow with a cardiologist. PCP: Amy ([place]) Smoker: 1 [facility].

## 2020-06-27 NOTE — H&P ADULT - NSHPPHYSICALEXAM_GEN_ALL_CORE
Vital Signs Last 24 Hrs  T(C): 37.1 (27 Jun 2020 17:04), Max: 37.1 (27 Jun 2020 17:04)  T(F): 98.8 (27 Jun 2020 17:04), Max: 98.8 (27 Jun 2020 17:04)  HR: 79 (27 Jun 2020 19:02) (79 - 82)  BP: 130/78 (27 Jun 2020 19:02) (130/78 - 154/110)  BP(mean): 108 (27 Jun 2020 14:42) (108 - 124)  RR: 20 (27 Jun 2020 19:02) (18 - 20)  SpO2: 100% (27 Jun 2020 19:02) (100% - 100%)    GENERAL: No acute distress  HEENT: PERRL, EOMI, MMM, no oropharyngeal lesions  NECK: left side of neck is tense. Otherwise supple, no JVD, no thyromegaly  PULM: respirations non-labored, clear to auscultation bilaterally, no rales, rhonchi, or wheezes  CV: regular rate and rhythm, no murmurs, gallops, or rubs  GI: abdomen soft, nontender, nondistended, no masses felt, normal bowel sounds  MSK: no joint swelling, erythema, or warmth.  LYMPH: no anterior cervical, posterior cervical, supraclavicular, or inguinal lymphadenopathy  NEURO: A&Ox3, no tremors, sensation intact  SKIN: no rashes, lesions, or edema

## 2020-06-27 NOTE — H&P ADULT - ASSESSMENT
41 yo F with a PMH HTN, migraines, and kidney stones who presents with chest pain due to NSTEMI.    1) NSTEMI  - With troponin increasing from 1.25 to 2.64  - Symptoms improving  - ED notified cardiology  - Given aspirin, clopidogrel, and started on a heparin gtt  - Will start high-intensity statin  - C/w home beta blocker and ACEi  - Monitor on telemetry  - Continue to trend troponin, monitor for worsening symptoms  - Serial EKGs  - GALINDO score of 2, HEART score of 6. May consider inpatient revascularization  - All patient questions/concerns answered  - Further plan pending full cardiology eval    2) HTN  - C/w Atenolol 25 mg QD  - C/w Enalapril 2.5 mg BID    3) Migraines  - Avoid Sumatriptan for now due to increased risk for angina/SYEDA in [place]    4) Left flank pain  - Concerning for renal stones given patient's history  - Obtain UA/urine cx  - Discussed with patient that can order imaging if symptoms get worse, but for now will continue to monitor    5) Prophylactic measure  - DVT PPX: IMPROVE score of 0, but on heparin gtt  - Diet: DASH  - Dispo: pending cardiology eval/workup 41 yo F with a PMH HTN, migraines, and kidney stones who presents with chest pain due to NSTEMI.    1) NSTEMI  - With troponin increasing from 1.25 to 2.64  - Symptoms improving  - ED notified cardiology (Rebecca)  - Given aspirin, clopidogrel, and started on a heparin gtt  - Will start high-intensity statin  - C/w home beta blocker and ACEi  - Monitor on telemetry  - Continue to trend troponin, monitor for worsening symptoms  - Serial EKGs  - GALINDO score of 2, HEART score of 6. May consider inpatient revascularization but no urgent need yet  - All patient questions/concerns answered  - Further plan pending full cardiology eval    2) HTN  - C/w Atenolol 25 mg QD  - C/w Enalapril 2.5 mg BID    3) Migraines  - Avoid Sumatriptan for now due to increased risk for angina/SYEDA in [place]    4) Left flank pain  - Concerning for renal stones given patient's history  - Obtain UA/urine cx  - Discussed with patient that can order imaging if symptoms get worse, but for now will continue to monitor    5) Prophylactic measure  - DVT PPX: IMPROVE score of 0, but on heparin gtt  - Diet: DASH  - Dispo: pending cardiology eval/workup

## 2020-06-27 NOTE — H&P ADULT - NSHPLABSRESULTS_GEN_ALL_CORE
Labs personally reviewed and interpreted. Notable for leukocytosis (WBC 18.92) with increased neutrophils. Hb 13.4, plt 232, Na 139, K 4.0, Cl 109, HCO3 21, BUN/creatinine 10/0.77 (baseline creatinine 0.58). . Calcium 9.2 with albumin 3.8. Mg 1.8. HCG negative. D-dimer negative. INR 1.04. Troponin elevated at 1.25 x1, then increased to 2.64 x1.    CXR personally reviewed and interpreted. Notable for clear lungs, no focal consolidations, effusions, interstitial markings, pneumothorax, or obvious cardiomegaly.    EKG personally reviewed. Normal sinus rhythm, normal axis. 1 mm Q waves in leads I, aVL, and V5-6. TWIs in V2-3 and V5-6. Slight UT depressions in leads I and II. Initial rate 81, , QTc 446. No ST depressions or elevations. Repeat EKG shows TWIs in V5-6 improving, but does note 1/4 mm appearing ST elevations in leads V3-V6 not meeting STEMI criteria.

## 2020-06-27 NOTE — H&P ADULT - HISTORY OF PRESENT ILLNESS
43 yo F with a PMH HTN, migraines, and kidney stones who presents with chest pain. At about 3:30 this morning after she woke up from a nap, she noticed a migraine typical of what she normally has. She took a dose of Sumatriptan as she normally does, and after, she developed severe chest discomfort, as a pressure across her entire anterior chest spreading down both her arms. She endorsed SOB. The symptoms improved after about 20 minutes. She decided to come to the ED later today. In the ED, she noted a milder chest tightness that also improved after about 15-20 minutes. Currently, she denies chest pain or SOB. ROS is also positive for a mild cough recently that has been improving, as well as palpitations that have been occurring for the past week. She has never had a stress test before.  She does note recently that she has been having an "incredible hot feeling" on the sides of her head with her migraines that is not associated with blurry vision. In addition, she thinks the left side of her neck becomes stiff during these episodes.    She does note for the past 5 days, that she has sharp pains in her left flank about 1-2 times per day, lasting a few seconds at a time and 8/10 in severity. She has been perimenopausal for some time. She denies dysuria or hematuria. She says she is concerned the pains could be similar to her previous kidney stones.    In the ED, she was given aspirin 325 mg PO x1, Clopidogrel 300 mg PO x1, and started on a heparin gtt with bolus.

## 2020-06-27 NOTE — ED STATDOCS - CLINICAL SUMMARY MEDICAL DECISION MAKING FREE TEXT BOX
44 YOF smoker with HTN presents with 1 episode of CP. Episode has since resolved. Pt felt pressure in her chest. Pt has never seen a cardiologist. Heart score is low. Will obtain cardiac enzymes, cardiac monitoring and reassess. Pt counseled she must see a cardiologist.

## 2020-06-27 NOTE — ED STATDOCS - NOTES
Dr. Weller wants patient admitted under hospitalist, and to start on Plavix, Heparin, ASA. Will follow patient. ~Jean Ferreira PA-C ~Jean Ferreira PA-C

## 2020-06-27 NOTE — H&P ADULT - NSHPSOCIALHISTORY_GEN_ALL_CORE
Current smoker, ~ 1 pack per day, for the past 6 years, had stopped for 17 years prior to that.  Social alcohol use (< once per week).  Smokes marijuana.

## 2020-06-27 NOTE — ED ADULT TRIAGE NOTE - CHIEF COMPLAINT QUOTE
pt sent to ED from  for chest pain last night w/ b/l arm numbness that resolved after approx. 10min. pt reports L. arm is still tingling but has full ROM, normal EKG from . LAMS 0. pt denies n/v, SOB, and chest pain at this time. pt sent directly to intake room for VS and EKG.

## 2020-06-27 NOTE — ED STATDOCS - PROGRESS NOTE DETAILS
PA note: Patient is a 44 year old female with PMHx of HTN, renal calculi who presents to ED c/o 5-10 minute episode of CP at 03:30 this morning. Pt had fallen asleep on the couch last night, woke up around 03:30am feeling chest pressure, heaviness when walking towards her bedroom. Patient laid down on her bed and CP improved after 10-15 minutes. Patient was finally able to sleep after one hour. No active chest pain at this time. +15 pack years of smoking, still smokes about 1ppd. ~Jean Ferreira PA-C   Patient seen and evaluated. EKG non-specific. Will get labs, CXR. Reassess. ~Jean Ferreira PA-C +Trop. Will contact Cardio. ~Jean Ferreira PA-C Spoke with Cardio Dr. Weller, will consult patient, wants admission by hospitalist, Heparin, ASA, Plavix loading dose. ~Jean Ferreira PA-C Spoke with hospitalist Dr. Moss. Will admit patient. ~Jean Ferreira PA-C

## 2020-06-27 NOTE — H&P ADULT - NSHPREVIEWOFSYSTEMS_GEN_ALL_CORE
Gen: + chills, weight gain, malaise. Negative for fevers or weight loss  Eyes: no blurred vision  ENT: no tinnitus or vertigo  Resp: + dyspnea. No wheezing, hemoptysis, or orthopnea  CV: + chest pain, palpitations  GI: no nausea, vomiting, abdominal pain, or diarrhea  : no dysuria, hematuria, or incontinence  MSK: + flank pain. No joint swelling  Neuro: + headache. No focal deficits, confusion, tremors, or seizures  Skin: no rash, lesions, or edema

## 2020-06-28 DIAGNOSIS — I10 ESSENTIAL (PRIMARY) HYPERTENSION: ICD-10-CM

## 2020-06-28 DIAGNOSIS — I21.4 NON-ST ELEVATION (NSTEMI) MYOCARDIAL INFARCTION: ICD-10-CM

## 2020-06-28 LAB
ANION GAP SERPL CALC-SCNC: 6 MMOL/L — SIGNIFICANT CHANGE UP (ref 5–17)
APTT BLD: 55.7 SEC — HIGH (ref 27.5–36.3)
APTT BLD: 59 SEC — HIGH (ref 27.5–36.3)
APTT BLD: 80.1 SEC — HIGH (ref 27.5–36.3)
BUN SERPL-MCNC: 10 MG/DL — SIGNIFICANT CHANGE UP (ref 7–23)
CALCIUM SERPL-MCNC: 8.7 MG/DL — SIGNIFICANT CHANGE UP (ref 8.5–10.1)
CHLORIDE SERPL-SCNC: 108 MMOL/L — SIGNIFICANT CHANGE UP (ref 96–108)
CO2 SERPL-SCNC: 23 MMOL/L — SIGNIFICANT CHANGE UP (ref 22–31)
CREAT SERPL-MCNC: 0.68 MG/DL — SIGNIFICANT CHANGE UP (ref 0.5–1.3)
GLUCOSE SERPL-MCNC: 98 MG/DL — SIGNIFICANT CHANGE UP (ref 70–99)
HCT VFR BLD CALC: 37.4 % — SIGNIFICANT CHANGE UP (ref 34.5–45)
HGB BLD-MCNC: 12.5 G/DL — SIGNIFICANT CHANGE UP (ref 11.5–15.5)
MAGNESIUM SERPL-MCNC: 1.8 MG/DL — SIGNIFICANT CHANGE UP (ref 1.6–2.6)
MCHC RBC-ENTMCNC: 30.4 PG — SIGNIFICANT CHANGE UP (ref 27–34)
MCHC RBC-ENTMCNC: 33.4 GM/DL — SIGNIFICANT CHANGE UP (ref 32–36)
MCV RBC AUTO: 91 FL — SIGNIFICANT CHANGE UP (ref 80–100)
PHOSPHATE SERPL-MCNC: 3.1 MG/DL — SIGNIFICANT CHANGE UP (ref 2.5–4.5)
PLATELET # BLD AUTO: 218 K/UL — SIGNIFICANT CHANGE UP (ref 150–400)
POTASSIUM SERPL-MCNC: 3.9 MMOL/L — SIGNIFICANT CHANGE UP (ref 3.5–5.3)
POTASSIUM SERPL-SCNC: 3.9 MMOL/L — SIGNIFICANT CHANGE UP (ref 3.5–5.3)
RBC # BLD: 4.11 M/UL — SIGNIFICANT CHANGE UP (ref 3.8–5.2)
RBC # FLD: 12.2 % — SIGNIFICANT CHANGE UP (ref 10.3–14.5)
SARS-COV-2 IGG SERPL QL IA: NEGATIVE — SIGNIFICANT CHANGE UP
SARS-COV-2 IGM SERPL IA-ACNC: <0.1 INDEX — SIGNIFICANT CHANGE UP
SODIUM SERPL-SCNC: 137 MMOL/L — SIGNIFICANT CHANGE UP (ref 135–145)
TROPONIN I SERPL-MCNC: 0.98 NG/ML — HIGH (ref 0.01–0.04)
WBC # BLD: 16.56 K/UL — HIGH (ref 3.8–10.5)
WBC # FLD AUTO: 16.56 K/UL — HIGH (ref 3.8–10.5)

## 2020-06-28 PROCEDURE — 99223 1ST HOSP IP/OBS HIGH 75: CPT

## 2020-06-28 PROCEDURE — 99233 SBSQ HOSP IP/OBS HIGH 50: CPT

## 2020-06-28 PROCEDURE — 93010 ELECTROCARDIOGRAM REPORT: CPT

## 2020-06-28 RX ORDER — MORPHINE SULFATE 50 MG/1
3 CAPSULE, EXTENDED RELEASE ORAL
Refills: 0 | Status: DISCONTINUED | OUTPATIENT
Start: 2020-06-28 | End: 2020-06-29

## 2020-06-28 RX ORDER — POTASSIUM CITRATE MONOHYDRATE 100 %
1 POWDER (GRAM) MISCELLANEOUS
Qty: 0 | Refills: 0 | DISCHARGE

## 2020-06-28 RX ORDER — ONDANSETRON 8 MG/1
4 TABLET, FILM COATED ORAL EVERY 4 HOURS
Refills: 0 | Status: DISCONTINUED | OUTPATIENT
Start: 2020-06-28 | End: 2020-06-29

## 2020-06-28 RX ADMIN — HEPARIN SODIUM 650 UNIT(S)/HR: 5000 INJECTION INTRAVENOUS; SUBCUTANEOUS at 00:28

## 2020-06-28 RX ADMIN — CLOPIDOGREL BISULFATE 75 MILLIGRAM(S): 75 TABLET, FILM COATED ORAL at 10:25

## 2020-06-28 RX ADMIN — MORPHINE SULFATE 2 MILLIGRAM(S): 50 CAPSULE, EXTENDED RELEASE ORAL at 06:59

## 2020-06-28 RX ADMIN — HEPARIN SODIUM 650 UNIT(S)/HR: 5000 INJECTION INTRAVENOUS; SUBCUTANEOUS at 08:41

## 2020-06-28 RX ADMIN — Medication 2.5 MILLIGRAM(S): at 10:23

## 2020-06-28 RX ADMIN — ATENOLOL 25 MILLIGRAM(S): 25 TABLET ORAL at 10:25

## 2020-06-28 RX ADMIN — ATORVASTATIN CALCIUM 80 MILLIGRAM(S): 80 TABLET, FILM COATED ORAL at 22:01

## 2020-06-28 RX ADMIN — HEPARIN SODIUM 650 UNIT(S)/HR: 5000 INJECTION INTRAVENOUS; SUBCUTANEOUS at 17:12

## 2020-06-28 RX ADMIN — MORPHINE SULFATE 3 MILLIGRAM(S): 50 CAPSULE, EXTENDED RELEASE ORAL at 11:57

## 2020-06-28 RX ADMIN — Medication 2.5 MILLIGRAM(S): at 22:01

## 2020-06-28 RX ADMIN — Medication 1 TABLET(S): at 10:25

## 2020-06-28 RX ADMIN — Medication 81 MILLIGRAM(S): at 10:23

## 2020-06-28 NOTE — PROGRESS NOTE ADULT - ATTENDING COMMENTS
patient seen and examined with PA student Chitra Wyman I was physically present for the key portions of the evaluation and management (E/M) service provided.  I agree with the above history, physical, and plan which I have reviewed and edited where appropriate.  - NSTEMI - case d/w cardio and plan for cath tomorrow and pt is agreeable   - urged tob cessation

## 2020-06-28 NOTE — CONSULT NOTE ADULT - SUBJECTIVE AND OBJECTIVE BOX
CHIEF COMPLAINT: Patient is a 44y old  Female who presents with a chief complaint of chest pain    HPI:  42 year old woman with a history of HTN, renal stones, and migraine headaches presented to the ER many hours after experiencing chest discomfort.  She describes a typical migraine at around 2:30 AM Saturday.  Shortly after taking a dose of oral sumatriptan she developed chest tightness associated with dyspnea and bilateral arm weakness.  Symptoms last ~ 15-30 minutes before subsiding and she eventually fell asleep.  She is unable to identify exacerbating or alleviating factors; presently feels well; no past history of heart diease.    PAST MEDICAL & SURGICAL HISTORY:  Renal calculi  Migraines  Essential hypertension  Status post placement of ureteral stent: H/o stent for kidney stone  History of     SOCIAL HISTORY:   Smoking: Current everyday smoker    FAMILY HISTORY: Family history of hyperlipidemia    MEDICATIONS  (STANDING):  aspirin enteric coated 81 milliGRAM(s) Oral daily  ATENolol  Tablet 25 milliGRAM(s) Oral daily  atorvastatin 80 milliGRAM(s) Oral at bedtime  clopidogrel Tablet 75 milliGRAM(s) Oral daily  enalapril 2.5 milliGRAM(s) Oral two times a day  heparin  Infusion.  Unit(s)/Hr (7 mL/Hr) IV Continuous <Continuous>  multivitamin 1 Tablet(s) Oral daily    MEDICATIONS  (PRN):  acetaminophen   Tablet .. 650 milliGRAM(s) Oral every 6 hours PRN Mild Pain (1 - 3), Moderate Pain (4 - 6)  heparin   Injectable 3500 Unit(s) IV Push every 6 hours PRN For aPTT less than 40  morphine  - Injectable 2 milliGRAM(s) IV Push every 6 hours PRN Severe Pain (7 - 10)    Allergies:    Cefzil (Hives)    REVIEW OF SYSTEMS:  CONSTITUTIONAL: No fevers or chills  Eyes: No visual changes  NECK: No pain or stiffness  RESPIRATORY: No cough, wheezing, hemoptysis; +shortness of breath  CARDIOVASCULAR: + chest pain   GASTROINTESTINAL: No abdominal pain. No nausea, vomiting, or hematemesis; No diarrhea or constipation. No melena or hematochezia.  GENITOURINARY: No dysuria, frequency or hematuria  NEUROLOGICAL: No numbness. +headache  SKIN: No itching or rash  All other review of systems is negative unless indicated above    VITAL SIGNS:   Vital Signs Last 24 Hrs  T(C): 36.8 (2020 06:54), Max: 37.1 (2020 17:04)  T(F): 98.2 (2020 06:54), Max: 98.8 (2020 17:04)  HR: 72 (2020 06:54) (72 - 84)  BP: 112/77 (2020 06:54) (112/77 - 154/110)  BP(mean): 86 (2020 06:54) (86 - 124)  RR: 18 (2020 06:54) (18 - 20)  SpO2: 99% (2020 06:54) (99% - 100%)    PHYSICAL EXAM:  Constitutional: NAD, awake and alert  HEENT:  EOMI,  Pupils round, No oral cyanosis.  Pulmonary: Non-labored, breath sounds are clear bilaterally, No wheezing, rales or rhonchi  Cardiovascular: S1 and S2, regular rate and rhythm, no Murmurs, gallops or rubs  Gastrointestinal: Bowel Sounds present, soft, nontender.   Lymph: No peripheral edema. No cervical lymphadenopathy.  Neurological: Alert, no focal deficits  Skin: No rashes.  Psych:  Mood & affect appropriate    LABS:                     12.5   16.56 )-----------( 218      ( 2020 06:29 )             37.4              137    |  108    |  10     ----------------------------<  98     3.9     |  23     |  0.68     CARDIAC MARKERS ( 2020 06:29 ) 0.977 ng/mL / x     / x     / x     / x      CARDIAC MARKERS ( 2020 21:29 ) 2.310 ng/mL / x     / x     / x     / x      CARDIAC MARKERS ( 2020 18:16 ) 2.640 ng/mL / x     / x     / x     / x      CARDIAC MARKERS ( 2020 14:57 ) 1.250 ng/mL / x     / x     / x     / x        ECG:  Multiple ECGs reviewed.  Sinus rhythm, dynamic T wave changes anterior/anterolateral leads    Xray Chest 2 Views PA/Lat (20 @ 15:12):  The lungs are clear. The cardiomediastinal silhouette is normal.

## 2020-06-28 NOTE — PROGRESS NOTE ADULT - ASSESSMENT
43 yo F with a PMH HTN, migraines, and kidney stones who presents with chest pain due to NSTEMI.    #NSTEMI  - With troponin now downtrending from 2.64 to 0.977  - Symptoms improving  - ED notified cardiology (Rebecca)  - Given aspirin, clopidogrel, and started on a heparin gtt  - Started high-intensity statin  - C/w home beta blocker and ACEi  - Monitor on telemetry  - Continue to trend troponin, monitor for worsening symptoms  - Serial EKGs  - GALINDO score of 2, HEART score of 6. Possible Coronary angiography to assess for CAD on Monday  - All patient questions/concerns answered  - Further plan pending full cardiology eval    #Leukocytosis  - UA and CXR negative  - monitor cbc  - Consult ID if no improvement    # HTN  - C/w Atenolol 25 mg QD  - C/w Enalapril 2.5 mg BID    3Migraines  - Avoid Sumatriptan for now due to increased risk for angina/SYEDA in [place]  - Increased Morphine dose     #Left flank pain  - Concerning for renal stones given patient's history  - Obtain UA/urine cx  - Discussed with patient that can order imaging if symptoms get worse, but for now will continue to monitor    #Prophylactic measure  - DVT PPX: IMPROVE score of 0, but on heparin gtt  - Diet: DASH  - Dispo: pending cardiology eval/workup 41 yo F with a PMH HTN, migraines, and kidney stones who presents with chest pain due to NSTEMI.    #NSTEMI  - With troponin now downtrending from 2.64 to 0.977  - Symptoms improving  - ED notified cardiology (Rebecca)  - Given aspirin, clopidogrel, and started on a heparin gtt  - Started high-intensity statin  - C/w home beta blocker and ACEi  - Monitor on telemetry  - Continue to trend troponin, monitor for worsening symptoms  - Serial EKGs  - GALINDO score of 2, HEART score of 6. Possible Coronary angiography to assess for CAD on Monday  - All patient questions/concerns answered  - Further plan pending full cardiology eval    #Leukocytosis - pt non toxic appearing and afebrile. ? recently passed stone  - UA and CXR negative  - monitor cbc  - Consult ID if no improvement    # HTN  - C/w Atenolol 25 mg QD  - C/w Enalapril 2.5 mg BID    #Migraines  - Avoid Sumatriptan for now due to increased risk for angina/SYEDA in [place]  - Increased Morphine dose     #Left flank pain  - Concerning for renal stones given patient's history  - Obtain UA/urine cx  - Discussed with patient that can order imaging if symptoms get worse, but for now will continue to monitor    # tob use - urged pt to quit and d/w her regarding her CV risk factors and increased risk for further cardiac events if she continues to smoke.  she plans to quit    #Prophylactic measure  - DVT PPX: IMPROVE score of 0, but on heparin gtt  - Diet: DASH  - Dispo: pending cardiology eval/workup

## 2020-06-28 NOTE — PROGRESS NOTE ADULT - SUBJECTIVE AND OBJECTIVE BOX
43 yo F with a PMH HTN, migraines, and kidney stones who presents with chest pain. At about 3:30 this morning after she woke up from a nap, she noticed a migraine typical of what she normally has. She took a dose of Sumatriptan as she normally does, and after, she developed severe chest discomfort, as a pressure across her entire anterior chest spreading down both her arms. She endorsed SOB. The symptoms improved after about 20 minutes. She decided to come to the ED later today. In the ED, she noted a milder chest tightness that also improved after about 15-20 minutes. Currently, she denies chest pain or SOB. ROS is also positive for a mild cough recently that has been improving, as well as palpitations that have been occurring for the past week. She has never had a stress test before.  She does note recently that she has been having an "incredible hot feeling" on the sides of her head with her migraines that is not associated with blurry vision. In addition, she thinks the left side of her neck becomes stiff during these episodes.    She does note for the past 5 days, that she has sharp pains in her left flank about 1-2 times per day, lasting a few seconds at a time and 8/10 in severity. She has been perimenopausal for some time. She denies dysuria or hematuria. She says she is concerned the pains could be similar to her previous kidney stones.    In the ED, she was given aspirin 325 mg PO x1, Clopidogrel 300 mg PO x1, and started on a heparin gtt with bolus.    6/28- Pt seen and examined. Patient complaining of migraine not relieved by morphine. Patient denies CP, sob    ROS:  Gen: + chills, weight gain, malaise. Negative for fevers or weight loss  Eyes: no blurred vision  ENT: no tinnitus or vertigo  Resp: + dyspnea. No wheezing, hemoptysis, or orthopnea  CV: + chest pain, palpitations  GI: no nausea, vomiting, abdominal pain, or diarrhea  : no dysuria, hematuria, or incontinence  MSK: + flank pain. No joint swelling  Neuro: + headache. No focal deficits, confusion, tremors, or seizures  Skin: no rash, lesions, or edema    Physical Exam:  Vital Signs Last 24 Hrs  T(C): 36.8 (28 Jun 2020 06:54), Max: 37.1 (27 Jun 2020 17:04)  T(F): 98.2 (28 Jun 2020 06:54), Max: 98.8 (27 Jun 2020 17:04)  HR: 72 (28 Jun 2020 06:54) (72 - 84)  BP: 112/77 (28 Jun 2020 06:54) (112/77 - 154/110)  BP(mean): 86 (28 Jun 2020 06:54) (86 - 124)  RR: 18 (28 Jun 2020 06:54) (18 - 20)  SpO2: 99% (28 Jun 2020 06:54) (99% - 100%)    GENERAL: No acute distress  	HEENT: PERRL, EOMI  	NECK: Supple, no JVD, no thyromegaly  	PULM: respirations non-labored, clear to auscultation bilaterally, no rales, rhonchi, or wheezes  	CV: regular rate and rhythm, no murmurs, gallops, or rubs  	GI: abdomen soft, nontender, nondistended, no masses felt, normal bowel sounds  	MSK: no joint swelling, erythema, or warmth.  	LYMPH: no anterior cervical, posterior cervical, supraclavicular, or inguinal lymphadenopathy  	NEURO: A&Ox3, no tremors, sensation intact  SKIN: no rashes, lesions, or edema    LABS:                        12.5   16.56 )-----------( 218      ( 28 Jun 2020 06:29 )             37.4     06-28    137  |  108  |  10  ----------------------------<  98  3.9   |  23  |  0.68    Ca    8.7      28 Jun 2020 06:29  Phos  3.1     06-28  Mg     1.8     06-28    TPro  7.6  /  Alb  3.8  /  TBili  0.5  /  DBili  x   /  AST  26  /  ALT  25  /  AlkPhos  90  06-27    Troponin I, Serum (06.28.20 @ 06:29)    Troponin I, Serum: 0.977: High Sensitivity Troponin and new reference  range effective 7/6/2016 ng/mL    Urinalysis (06.27.20 @ 21:17)    Glucose Qualitative, Urine: Negative mg/dL    Blood, Urine: Small    pH Urine: 6.0    Color: Yellow    Urine Appearance: Clear    Bilirubin: Negative    Ketone - Urine: Trace    Specific Gravity: 1.010    Protein, Urine: Negative mg/dL    Urobilinogen: Negative mg/dL    Nitrite: Negative    Leukocyte Esterase Concentration: Negative      MEDICATIONS  (STANDING):  aspirin enteric coated 81 milliGRAM(s) Oral daily  ATENolol  Tablet 25 milliGRAM(s) Oral daily  atorvastatin 80 milliGRAM(s) Oral at bedtime  clopidogrel Tablet 75 milliGRAM(s) Oral daily  enalapril 2.5 milliGRAM(s) Oral two times a day  heparin  Infusion.  Unit(s)/Hr (7 mL/Hr) IV Continuous <Continuous>  multivitamin 1 Tablet(s) Oral daily    MEDICATIONS  (PRN):  acetaminophen   Tablet .. 650 milliGRAM(s) Oral every 6 hours PRN Mild Pain (1 - 3), Moderate Pain (4 - 6)  heparin   Injectable 3500 Unit(s) IV Push every 6 hours PRN For aPTT less than 40  morphine  - Injectable 3 milliGRAM(s) IV Push every 3 hours PRN Severe Pain (7 - 10)

## 2020-06-28 NOTE — CONSULT NOTE ADULT - PROBLEM SELECTOR RECOMMENDATION 9
History suggestive of possible coronary vasospasm -- will need coronary angiography to assess for CAD; currently CP-free and with downtrending troponin; continue aspirin, Plavix, atorvastatin, atenolol; do not administer sumatriptan at this time if migraine recurs.

## 2020-06-29 ENCOUNTER — TRANSCRIPTION ENCOUNTER (OUTPATIENT)
Age: 45
End: 2020-06-29

## 2020-06-29 VITALS
OXYGEN SATURATION: 100 % | RESPIRATION RATE: 18 BRPM | DIASTOLIC BLOOD PRESSURE: 86 MMHG | SYSTOLIC BLOOD PRESSURE: 127 MMHG | HEART RATE: 76 BPM

## 2020-06-29 DIAGNOSIS — G43.909 MIGRAINE, UNSPECIFIED, NOT INTRACTABLE, WITHOUT STATUS MIGRAINOSUS: ICD-10-CM

## 2020-06-29 LAB
APTT BLD: 51.3 SEC — HIGH (ref 27.5–36.3)
CULTURE RESULTS: SIGNIFICANT CHANGE UP
HCT VFR BLD CALC: 36.1 % — SIGNIFICANT CHANGE UP (ref 34.5–45)
HGB BLD-MCNC: 12.2 G/DL — SIGNIFICANT CHANGE UP (ref 11.5–15.5)
MCHC RBC-ENTMCNC: 30.8 PG — SIGNIFICANT CHANGE UP (ref 27–34)
MCHC RBC-ENTMCNC: 33.8 GM/DL — SIGNIFICANT CHANGE UP (ref 32–36)
MCV RBC AUTO: 91.2 FL — SIGNIFICANT CHANGE UP (ref 80–100)
PLATELET # BLD AUTO: 204 K/UL — SIGNIFICANT CHANGE UP (ref 150–400)
RBC # BLD: 3.96 M/UL — SIGNIFICANT CHANGE UP (ref 3.8–5.2)
RBC # FLD: 12.2 % — SIGNIFICANT CHANGE UP (ref 10.3–14.5)
SPECIMEN SOURCE: SIGNIFICANT CHANGE UP
WBC # BLD: 12.18 K/UL — HIGH (ref 3.8–10.5)
WBC # FLD AUTO: 12.18 K/UL — HIGH (ref 3.8–10.5)

## 2020-06-29 PROCEDURE — 93454 CORONARY ARTERY ANGIO S&I: CPT | Mod: 26

## 2020-06-29 PROCEDURE — 99239 HOSP IP/OBS DSCHRG MGMT >30: CPT

## 2020-06-29 PROCEDURE — 93010 ELECTROCARDIOGRAM REPORT: CPT

## 2020-06-29 PROCEDURE — 99233 SBSQ HOSP IP/OBS HIGH 50: CPT

## 2020-06-29 RX ORDER — SUMATRIPTAN SUCCINATE 4 MG/.5ML
1 INJECTION, SOLUTION SUBCUTANEOUS
Qty: 0 | Refills: 0 | DISCHARGE

## 2020-06-29 RX ORDER — ATENOLOL 25 MG/1
1 TABLET ORAL
Qty: 0 | Refills: 0 | DISCHARGE

## 2020-06-29 RX ORDER — METOPROLOL TARTRATE 50 MG
0.5 TABLET ORAL
Qty: 30 | Refills: 0
Start: 2020-06-29

## 2020-06-29 RX ADMIN — Medication 650 MILLIGRAM(S): at 12:55

## 2020-06-29 RX ADMIN — HEPARIN SODIUM 750 UNIT(S)/HR: 5000 INJECTION INTRAVENOUS; SUBCUTANEOUS at 08:40

## 2020-06-29 NOTE — DISCHARGE NOTE PROVIDER - CARE PROVIDER_API CALL
Jad Reyes  [id number]  [address] [place] [address]  Phone: [phone]  Fax: [phone]  Follow Up Time:     Davide Fernandez  CARDIOVASCULAR DISEASE  [address] [place] [address]  Phone: [phone]  Fax: [phone]  Follow Up Time: 2 weeks Jad Reyes  [id number]  [address] [place] [address]  Phone: [phone]  Fax: [phone]  Follow Up Time:     Davide Fernandez  CARDIOVASCULAR DISEASE  [address] [place] [address]  Phone: [phone]  Fax: [phone]  Follow Up Time: 2 weeks    Your Neurologist,   Phone: (   )    -  Fax: (   )    -  Follow Up Time: 1 week

## 2020-06-29 NOTE — PACU DISCHARGE NOTE - COMMENTS
Report given to Milana, receiving RN on [address].  Pt. replaced on portable cardiac telemetry monitor and reading confirmed w/ Milana, telemetry monitor tech.  Bedside echocardiogram completed and pt. transferred to inpt. room on cardiac monitor via stretcher accompanied by transport tech.

## 2020-06-29 NOTE — PROGRESS NOTE ADULT - ASSESSMENT
OK to d/c from cardiology standpoint w/ followup in cardiology clinic w/ Dr. Fernandez in 1 week.  will sign off please call if questions.

## 2020-06-29 NOTE — PROGRESS NOTE ADULT - SUBJECTIVE AND OBJECTIVE BOX
HPI:  41 yo F with a PMH of HTN, migraines, and kidney stones who presents with chest pain. At about 3:30 this morning after she woke up from a nap, she noticed a migraine typical of what she normally has. She took a dose of Sumatriptan as she normally does, and after, she developed severe chest discomfort, as a pressure across her entire anterior chest spreading down both her arms. She endorsed SOB. The symptoms improved after about 20 minutes. She decided to come to the ED.   In ED, Pt noted to be NSTEMI, troponin peak at 2.6, EKG with anterolateral ischemia.   Pt brought to cath lab for ischemic evaluation.     ASA class: II  Cr: 0.68  GFR: 106  Bleeding risk: 1.4%     Now, Pt is s/p LHC, revealed normal coronaries, likely small vessel disease or coronary artery spasm.       ROS: denies chest pain/ pressure, SOB or palpitation     Vital Signs;  T(C): 36.8 (06-29-20 @ 09:50), Max: 36.8 (06-29-20 @ 09:45)  HR: 74 (06-29-20 @ 09:50) (61 - 81)  BP: 132/88 (06-29-20 @ 09:50) (103/58 - 132/88)  RR: 16 (06-29-20 @ 09:50) (16 - 18)  SpO2: 99% (06-29-20 @ 09:50) (98% - 100%)    Physical Exam   General: awake, no acute distress   HEENT: NCAT, neck supple   CV: RRR, normal S1S2, no murmur/ rub   Pulmonary: clear, no wheezing or rales   GI: +BS, soft, non-tender, non-distended   : voiding freely   Extremities: no edema, + pedal pulses   Skin: no rashes or lesion. Rt. radial access site with radial band (to be removed at 12: 30pm): no hematoma or bleeding     Labs:  LABS: All Labs Reviewed:                        12.2   12.18 )-----------( 204      ( 29 Jun 2020 07:56 )             36.1     06-28    137  |  108  |  10  ----------------------------<  98  3.9   |  23  |  0.68    Ca    8.7      28 Jun 2020 06:29  Phos  3.1     06-28  Mg     1.8     06-28    TPro  7.6  /  Alb  3.8  /  TBili  0.5  /  DBili  x   /  AST  26  /  ALT  25  /  AlkPhos  90  06-27    PT/INR - ( 27 Jun 2020 14:57 )   PT: 11.6 sec;   INR: 1.04 ratio    PTT - ( 29 Jun 2020 07:56 )  PTT:51.3 sec  CARDIAC MARKERS ( 28 Jun 2020 06:29 )  0.977 ng/mL / x     / x     / x     / x      CARDIAC MARKERS ( 27 Jun 2020 21:29 )  2.310 ng/mL / x     / x     / x     / x      CARDIAC MARKERS ( 27 Jun 2020 18:16 )  2.640 ng/mL / x     / x     / x     / x      CARDIAC MARKERS ( 27 Jun 2020 14:57 )  1.250 ng/mL / x     / x     / x     / x        RADIOLOGY/EKG:  < from: 12 Lead ECG (06.29.20 @ 07:42) >  Diagnosis Line Normal sinus rhythm  ST & Marked T wave abnormality, consider anterolateral ischemia  Prolonged QT  Abnormal ECG  When compared with ECG of 28-JUN-2020 07:19,  ST no longer elevated in Lateral leads  Inverted T waves have replaced nonspecific T wave abnormality in Inferior leads  T wave inversion more evident in Lateral leads  Confirmed by Agus Rivas MD ([id number]) on 6/29/2020 9:26:46 AM  < end of copied text >    < from: Cardiac Cath Lab - Adult (06.29.20 @ 11:18) >  Angiographic Findings  Cardiac Arteries and Lesion Findings  LMCA: Normal.  LAD: Normal.  LCx: Normal.  RCA: Normal.   Impression  Diagnostic Conclusions  Findings are consistent with small vessel disease or coronary spasm  Recommendations  Medical treatment and smoking cessation  < end of copied text >    Medications:  acetaminophen   Tablet .. 650 milliGRAM(s) Oral every 6 hours PRN  aspirin enteric coated 81 milliGRAM(s) Oral daily  ATENolol  Tablet 25 milliGRAM(s) Oral daily  atorvastatin 80 milliGRAM(s) Oral at bedtime  clopidogrel Tablet 75 milliGRAM(s) Oral daily  enalapril 2.5 milliGRAM(s) Oral two times a day  heparin   Injectable 3500 Unit(s) IV Push every 6 hours PRN  heparin  Infusion.  Unit(s)/Hr IV Continuous <Continuous>  morphine  - Injectable 3 milliGRAM(s) IV Push every 3 hours PRN  multivitamin 1 Tablet(s) Oral daily  ondansetron Injectable 4 milliGRAM(s) IV Push every 4 hours PRN    # s/p LHC, normal coronaries, likely small vessel disease or coronary artery spasm  - return to the unit   - discontinue Plavix and Heparin gtt   - continue ACEI  - continue statin  - post procedure, outcome and follow up care reviewed with patient  - follow up with Cardiologist HPI:  43 yo F with a PMH of HTN, migraines, and kidney stones who presents with chest pain. At about 3:30 this morning after she woke up from a nap, she noticed a migraine typical of what she normally has. She took a dose of Sumatriptan as she normally does, and after, she developed severe chest discomfort, as a pressure across her entire anterior chest spreading down both her arms. She endorsed SOB. The symptoms improved after about 20 minutes. She decided to come to the ED.   In ED, Pt noted to be NSTEMI, troponin peak at 2.6, EKG with anterolateral ischemia.   Pt brought to cath lab for ischemic evaluation.     ASA class: II  Cr: 0.68  GFR: 106  Bleeding risk: 1.4%     Now, Pt is s/p LHC, revealed normal coronaries, likely small vessel disease or coronary artery spasm.       ROS: denies chest pain/ pressure, SOB or palpitation     Vital Signs;  T(C): 36.8 (06-29-20 @ 09:50), Max: 36.8 (06-29-20 @ 09:45)  HR: 74 (06-29-20 @ 09:50) (61 - 81)  BP: 132/88 (06-29-20 @ 09:50) (103/58 - 132/88)  RR: 16 (06-29-20 @ 09:50) (16 - 18)  SpO2: 99% (06-29-20 @ 09:50) (98% - 100%)    Physical Exam   General: awake, no acute distress   HEENT: NCAT, neck supple   CV: RRR, normal S1S2, no murmur/ rub   Pulmonary: clear, no wheezing or rales   GI: +BS, soft, non-tender, non-distended   : voiding freely   Extremities: no edema, + pedal pulses   Skin: no rashes or lesion. Rt. radial access site with radial band (to be removed at 12: 30pm): no hematoma or bleeding     Labs:  LABS: All Labs Reviewed:                        12.2   12.18 )-----------( 204      ( 29 Jun 2020 07:56 )             36.1     06-28    137  |  108  |  10  ----------------------------<  98  3.9   |  23  |  0.68    Ca    8.7      28 Jun 2020 06:29  Phos  3.1     06-28  Mg     1.8     06-28    TPro  7.6  /  Alb  3.8  /  TBili  0.5  /  DBili  x   /  AST  26  /  ALT  25  /  AlkPhos  90  06-27    PT/INR - ( 27 Jun 2020 14:57 )   PT: 11.6 sec;   INR: 1.04 ratio    PTT - ( 29 Jun 2020 07:56 )  PTT:51.3 sec  CARDIAC MARKERS ( 28 Jun 2020 06:29 )  0.977 ng/mL / x     / x     / x     / x      CARDIAC MARKERS ( 27 Jun 2020 21:29 )  2.310 ng/mL / x     / x     / x     / x      CARDIAC MARKERS ( 27 Jun 2020 18:16 )  2.640 ng/mL / x     / x     / x     / x      CARDIAC MARKERS ( 27 Jun 2020 14:57 )  1.250 ng/mL / x     / x     / x     / x        RADIOLOGY/EKG:  < from: 12 Lead ECG (06.29.20 @ 07:42) >  Diagnosis Line Normal sinus rhythm  ST & Marked T wave abnormality, consider anterolateral ischemia  Prolonged QT  Abnormal ECG  When compared with ECG of 28-JUN-2020 07:19,  ST no longer elevated in Lateral leads  Inverted T waves have replaced nonspecific T wave abnormality in Inferior leads  T wave inversion more evident in Lateral leads  Confirmed by Agus Rivas MD ([id number]) on 6/29/2020 9:26:46 AM  < end of copied text >    < from: Cardiac Cath Lab - Adult (06.29.20 @ 11:18) >  Angiographic Findings  Cardiac Arteries and Lesion Findings  LMCA: Normal.  LAD: Normal.  LCx: Normal.  RCA: Normal.   Impression  Diagnostic Conclusions  Findings are consistent with small vessel disease or coronary spasm  Recommendations  Medical treatment and smoking cessation  < end of copied text >    Medications:  acetaminophen   Tablet .. 650 milliGRAM(s) Oral every 6 hours PRN  aspirin enteric coated 81 milliGRAM(s) Oral daily  ATENolol  Tablet 25 milliGRAM(s) Oral daily  atorvastatin 80 milliGRAM(s) Oral at bedtime  clopidogrel Tablet 75 milliGRAM(s) Oral daily  enalapril 2.5 milliGRAM(s) Oral two times a day  heparin   Injectable 3500 Unit(s) IV Push every 6 hours PRN  heparin  Infusion.  Unit(s)/Hr IV Continuous <Continuous>  morphine  - Injectable 3 milliGRAM(s) IV Push every 3 hours PRN  multivitamin 1 Tablet(s) Oral daily  ondansetron Injectable 4 milliGRAM(s) IV Push every 4 hours PRN    # s/p LHC, normal coronaries, likely small vessel disease or coronary artery spasm  - return to the unit   - discontinue Plavix and Heparin gtt   - continue ACEI  - continue statin  - smoking cessation   - post procedure, outcome and follow up care reviewed with patient  - follow up with Cardiologist HPI:  43 yo F with a PMH of HTN, migraines, and kidney stones who presents with chest pain. At about 3:30 this morning after she woke up from a nap, she noticed a migraine typical of what she normally has. She took a dose of Sumatriptan as she normally does, and after, she developed severe chest discomfort, as a pressure across her entire anterior chest spreading down both her arms. She endorsed SOB. The symptoms improved after about 20 minutes. She decided to come to the ED.   In ED, Pt noted to be NSTEMI, troponin peak at 2.6, EKG with anterolateral ischemia.   Pt brought to cath lab for ischemic evaluation.     ASA class: II  Cr: 0.68  GFR: 106  Bleeding risk: 1.4%     Now, Pt is s/p LHC, revealed normal coronaries, likely small vessel disease or coronary artery spasm.       ROS: denies chest pain/ pressure, SOB or palpitation     Vital Signs;  T(C): 36.8 (06-29-20 @ 09:50), Max: 36.8 (06-29-20 @ 09:45)  HR: 74 (06-29-20 @ 09:50) (61 - 81)  BP: 132/88 (06-29-20 @ 09:50) (103/58 - 132/88)  RR: 16 (06-29-20 @ 09:50) (16 - 18)  SpO2: 99% (06-29-20 @ 09:50) (98% - 100%)    Physical Exam   General: awake, no acute distress   HEENT: NCAT, neck supple   CV: RRR, normal S1S2, no murmur/ rub   Pulmonary: clear, no wheezing or rales   GI: +BS, soft, non-tender, non-distended   : voiding freely   Extremities: no edema, + pedal pulses   Skin: no rashes or lesion. Rt. radial access site with radial band (to be removed at 12: 30pm): no hematoma or bleeding     Labs:  LABS: All Labs Reviewed:                        12.2   12.18 )-----------( 204      ( 29 Jun 2020 07:56 )             36.1     06-28    137  |  108  |  10  ----------------------------<  98  3.9   |  23  |  0.68    Ca    8.7      28 Jun 2020 06:29  Phos  3.1     06-28  Mg     1.8     06-28    TPro  7.6  /  Alb  3.8  /  TBili  0.5  /  DBili  x   /  AST  26  /  ALT  25  /  AlkPhos  90  06-27    PT/INR - ( 27 Jun 2020 14:57 )   PT: 11.6 sec;   INR: 1.04 ratio    PTT - ( 29 Jun 2020 07:56 )  PTT:51.3 sec  CARDIAC MARKERS ( 28 Jun 2020 06:29 )  0.977 ng/mL / x     / x     / x     / x      CARDIAC MARKERS ( 27 Jun 2020 21:29 )  2.310 ng/mL / x     / x     / x     / x      CARDIAC MARKERS ( 27 Jun 2020 18:16 )  2.640 ng/mL / x     / x     / x     / x      CARDIAC MARKERS ( 27 Jun 2020 14:57 )  1.250 ng/mL / x     / x     / x     / x        RADIOLOGY/EKG:  < from: 12 Lead ECG (06.29.20 @ 07:42) >  Diagnosis Line Normal sinus rhythm  ST & Marked T wave abnormality, consider anterolateral ischemia  Prolonged QT  Abnormal ECG  When compared with ECG of 28-JUN-2020 07:19,  ST no longer elevated in Lateral leads  Inverted T waves have replaced nonspecific T wave abnormality in Inferior leads  T wave inversion more evident in Lateral leads  Confirmed by Agus Rivas MD ([id number]) on 6/29/2020 9:26:46 AM  < end of copied text >    < from: Cardiac Cath Lab - Adult (06.29.20 @ 11:18) >  Angiographic Findings  Cardiac Arteries and Lesion Findings  LMCA: Normal.  LAD: Normal.  LCx: Normal.  RCA: Normal.   Impression  Diagnostic Conclusions  Findings are consistent with small vessel disease or coronary spasm  Recommendations  Medical treatment and smoking cessation  < end of copied text >    Medications:  acetaminophen   Tablet .. 650 milliGRAM(s) Oral every 6 hours PRN  aspirin enteric coated 81 milliGRAM(s) Oral daily  ATENolol  Tablet 25 milliGRAM(s) Oral daily  atorvastatin 80 milliGRAM(s) Oral at bedtime  clopidogrel Tablet 75 milliGRAM(s) Oral daily  enalapril 2.5 milliGRAM(s) Oral two times a day  heparin   Injectable 3500 Unit(s) IV Push every 6 hours PRN  heparin  Infusion.  Unit(s)/Hr IV Continuous <Continuous>  morphine  - Injectable 3 milliGRAM(s) IV Push every 3 hours PRN  multivitamin 1 Tablet(s) Oral daily  ondansetron Injectable 4 milliGRAM(s) IV Push every 4 hours PRN    # s/p LHC, normal coronaries, likely small vessel disease or coronary artery spasm  - return to the unit   - discontinue ASA, Plavix, statin and Heparin gtt   - discontinue home med, Sumatriptan on discharge today   - continue ACEI and BB   - smoking cessation  - Echo today to assess structures    - post procedure, outcome and follow up care reviewed with patient  - follow up with Cardiologist, Dr. Fernandez in 2 weeks.     Discussed the plan with Dr. Huang, Dr. Celis, Dr. Fernandez, Dr. Steel and Pt.

## 2020-06-29 NOTE — DISCHARGE NOTE PROVIDER - NSDCMRMEDTOKEN_GEN_ALL_CORE_FT
atenolol 25 mg oral tablet: 1 tab(s) orally once a day  enalapril 2.5 mg oral tablet: 1 tab(s) orally 2 times a day  Multiple Vitamins oral tablet: 1 tab(s) orally once a day  potassium citrate 10 mEq oral tablet, extended release: 1 tab(s) orally 2 times a day  SUMAtriptan 100 mg oral tablet: 1 tab(s) orally 2 times a day at least 2 hours between doses as needed enalapril 2.5 mg oral tablet: 1 tab(s) orally 2 times a day  metoprolol succinate 25 mg oral tablet, extended release: 0.5 tab(s) orally 2 times a day   Multiple Vitamins oral tablet: 1 tab(s) orally once a day  potassium citrate 10 mEq oral tablet, extended release: 1 tab(s) orally 2 times a day

## 2020-06-29 NOTE — PROGRESS NOTE ADULT - SUBJECTIVE AND OBJECTIVE BOX
43 y/o w/ h/o HTN, migraine headaches, renal stones admitted for chest pain.  Trop   peak 2.64, ECG w/ T wave inversions in the anterior/anterolateral leads.  Cath today showed no obstructive or luminal disease - normal coronaries.  Echo today showed apical & mid ventricular hypokinesis w/ EF=40-45% consistent w/ stress induced cardiomyopathy.  Discussed w/ Dr. Huang, pt likely had coronary vasospasm associated w/ sumatriptan use.    Reviewed history w/ pt.  Denies any dyspnea, wt gain, LE edema or other CHF symptoms.  Reports emotional stress over past 2 yrs associated w/ divorce but no recent severe emotional stress other than chest discomfort immediately prior to admit.      MEDICATIONS:  MEDICATIONS  (STANDING):  ATENolol  Tablet 25 milliGRAM(s) Oral daily  enalapril 2.5 milliGRAM(s) Oral two times a day  multivitamin 1 Tablet(s) Oral daily    MEDICATIONS  (PRN):  acetaminophen   Tablet .. 650 milliGRAM(s) Oral every 6 hours PRN Mild Pain (1 - 3), Moderate Pain (4 - 6)  morphine  - Injectable 3 milliGRAM(s) IV Push every 3 hours PRN Severe Pain (7 - 10)  ondansetron Injectable 4 milliGRAM(s) IV Push every 4 hours PRN Nausea and/or Vomiting      Vital Signs Last 24 Hrs  T(C): 36.8 (29 Jun 2020 09:50), Max: 36.8 (29 Jun 2020 09:45)  T(F): 98.3 (29 Jun 2020 09:50), Max: 98.3 (29 Jun 2020 09:45)  HR: 76 (29 Jun 2020 13:20) (61 - 86)  BP: 127/86 (29 Jun 2020 13:20) (103/58 - 132/88)  BP(mean): --  RR: 18 (29 Jun 2020 13:20) (16 - 18)  SpO2: 100% (29 Jun 2020 13:20) (98% - 100%)    I&O's Summary    28 Jun 2020 07:01  -  29 Jun 2020 07:00  --------------------------------------------------------  IN: 130 mL / OUT: 0 mL / NET: 130 mL        PHYSICAL EXAM:    Constitutional: NAD, awake and alert,   HEENT: PERR, EOMI,    Neck:  supple,  No JVD  Respiratory: Breath sounds are clear bilaterally,   Cardiovascular: S1 and S2, regular rate and rhythm, no Murmurs, gallops or rubs  Gastrointestinal: Bowel Sounds present, soft, nontender.   Extremities: No peripheral edema. No clubbing or cyanosis.  Vascular: 2+ peripheral pulses  Neurological: A/O x 3, no focal deficits    LABS: All Labs Reviewed:                        12.2   12.18 )-----------( 204      ( 29 Jun 2020 07:56 )             36.1                         12.5   16.56 )-----------( 218      ( 28 Jun 2020 06:29 )             37.4                         12.3   18.07 )-----------( 209      ( 27 Jun 2020 23:49 )             36.4     28 Jun 2020 06:29    137    |  108    |  10     ----------------------------<  98     3.9     |  23     |  0.68   27 Jun 2020 14:57    139    |  109    |  10     ----------------------------<  100    4.0     |  21     |  0.77     Ca    8.7        28 Jun 2020 06:29  Ca    9.2        27 Jun 2020 14:57  Phos  3.1       28 Jun 2020 06:29  Mg     1.8       28 Jun 2020 06:29  Mg     1.8       27 Jun 2020 14:57    TPro  7.6    /  Alb  3.8    /  TBili  0.5    /  DBili  x      /  AST  26     /  ALT  25     /  AlkPhos  90     27 Jun 2020 14:57    PTT - ( 29 Jun 2020 07:56 )  PTT:51.3 sec  CARDIAC MARKERS ( 28 Jun 2020 06:29 )  0.977 ng/mL / x     / x     / x     / x      CARDIAC MARKERS ( 27 Jun 2020 21:29 )  2.310 ng/mL / x     / x     / x     / x      CARDIAC MARKERS ( 27 Jun 2020 18:16 )  2.640 ng/mL / x     / x     / x     / x          ECG:  Multiple ECGs reviewed.  Sinus rhythm, dynamic T wave changes anterior/anterolateral leads    Xray Chest 2 Views PA/Lat (06.27.20 @ 15:12):  The lungs are clear. The cardiomediastinal silhouette is normal.    < from: TTE Echo Complete w/o Contrast w/ Doppler (06.29.20 @ 13:48) >   Impression     Summary     Left ventricle systolic function appears moderately reduced. EF = 40-45%.   Regional wall motion abnormalities are present: the mid and apical   segments are hypokinetic consistent w/ stress induced cardiomyopathy.   Normal LV size & wall thickness.   Mild (1+) mitral regurgitation is present.   Moderate (2+) tricuspid valve regurgitation is present. Normal pulmonary   artery systolic pressures.

## 2020-06-29 NOTE — DISCHARGE NOTE PROVIDER - PROVIDER TOKENS
PROVIDER:[TOKEN:[[id number]]],PROVIDER:[TOKEN:[[id number]],FOLLOWUP:[2 weeks]] PROVIDER:[TOKEN:[[id number]]],PROVIDER:[TOKEN:[[id number]],FOLLOWUP:[2 weeks]],FREE:[LAST:[Your Neurologist],PHONE:[(   )    -],FAX:[(   )    -],FOLLOWUP:[1 week]]

## 2020-06-29 NOTE — PROGRESS NOTE ADULT - PROBLEM SELECTOR PLAN 1
Peak trop 2.6.  ECG w/ T wave inversions anterolaterally.  Cath w/ normal coronaries w/o obstructive or luminal disease.  Echo w/ EF 40-45% w/ contractility pattern c/w stress-induced cardiomyopathy.  Pt's stress induced cardiomyopathy may have been the stress of severe vasospasm & chest pain.  Alternatively vasospasm in the LAD territory may have caused stress induced contractility pattern & myocardial stunning. Recommend continuing enalapril 2.5 mg po BID, change atenolol to metoprolol succinate 12.5 po BID (more appropriate BB for LV systolic dysfunction) & followup in cardiology clinic in 1 week.  Consider echo in 1 month to reevaluate LV systolic function.  Trop elevation is a type 2 MI - not related to ACS - no need for ASA, statin.

## 2020-06-29 NOTE — DISCHARGE NOTE PROVIDER - HOSPITAL COURSE
41 yo F with a PMH HTN, migraines, and kidney stones who presents with chest pain. At about 3:30 this morning after she woke up from a nap, she noticed a migraine typical of what she normally has. She took a dose of Sumatriptan as she normally does, and after, she developed severe chest discomfort, as a pressure across her entire anterior chest spreading down both her arms. She endorsed SOB. The symptoms improved after about 20 minutes. She decided to come to the ED later today. In the ED, she noted a milder chest tightness that also improved after about 15-20 minutes. Currently, she denies chest pain or SOB. ROS is also positive for a mild cough recently that has been improving, as well as palpitations that have been occurring for the past week. She has never had a stress test before. She does note recently that she has been having an "incredible hot feeling" on the sides of her head with her migraines that is not associated with blurry vision. In addition, she thinks the left side of her neck becomes stiff during these episodes.    She does note for the past 5 days, that she has sharp pains in her left flank about 1-2 times per day, lasting a few seconds at a time and 8/10 in severity. She has been perimenopausal for some time. She denies dysuria or hematuria. She says she is concerned the pains could be similar to her previous kidney stones.    In the ED, she was given aspirin 325 mg PO x1, Clopidogrel 300 mg PO x1, and started on a heparin gtt with bolus. Patient admitted with NSTEMI to Telemetry. Cardiology consulted. Patient had no events on Telemetry. Cardiac angiography done showing no obstruction, but consistent with small vessel disease or coronary vasospasm.         PHYSICAL EXAM:    Vital Signs Last 24 Hrs    T(C): 36.8 (29 Jun 2020 09:50), Max: 36.8 (29 Jun 2020 09:45)    T(F): 98.3 (29 Jun 2020 09:50), Max: 98.3 (29 Jun 2020 09:45)    HR: 78 (29 Jun 2020 12:50) (61 - 86)    BP: 118/71 (29 Jun 2020 12:50) (103/58 - 132/88)    BP(mean): --    RR: 16 (29 Jun 2020 12:50) (16 - 18)    SpO2: 100% (29 Jun 2020 12:50) (98% - 100%)        GENERAL: No acute distress    HEENT: PERRL, EOMI    NECK: Supple, no JVD, no thyromegaly    PULM: respirations non-labored, clear to auscultation bilaterally, no rales, rhonchi, or wheezes    CV: regular rate and rhythm, no murmurs, gallops, or rubs    GI: abdomen soft, nontender, nondistended, no masses felt, normal bowel sounds    MSK: no joint swelling, erythema, or warmth.    LYMPH: no anterior cervical, posterior cervical, supraclavicular, or inguinal lymphadenopathy    NEURO: A&Ox3, no tremors, sensation intact    SKIN: no rashes, lesions, or edema        Plan:    #NSTEMI    - With troponin now downtrending from 2.64 to 0.977    - Symptoms resolved    - Cardiology consult appreciated    - Given aspirin, clopidogrel, and started on a heparin gtt    - Discontinue Clopidogrel and Heparin ggt    - Started high-intensity statin- atorvastatin    - C/w home beta blocker and ACEi    - Monitored on telemetry    - Continue to trend troponin, monitor for worsening symptoms    - Serial EKGs    - GALINDO score of 2, HEART score of 6.     - Coronary angiography to assess for CAD done today- no occlusions. Small vessel disease or coronary vasospasm    - All patient questions/concerns answered        #Leukocytosis - pt non toxic appearing and afebrile. ? recently passed stone    - UA and CXR negative    - monitor cbc    - WBC downtrending        # HTN    - C/w Atenolol 25 mg QD    - C/w Enalapril 2.5 mg BID        #Migraines    - Avoid Sumatriptan for now due to increased risk for angina/SYEDA in [place]    - Increased Morphine dose         #Left flank pain    - Concerning for renal stones given patient's history    - Obtain UA/urine cx    - Discussed with patient that can order imaging if symptoms get worse, but for now will continue to monitor        # tob use - urged pt to quit and d/w her regarding her CV risk factors and increased risk for further cardiac events if she continues to smoke.  she plans to quit        #Prophylactic measure    - DVT PPX: IMPROVE score of 0, but on heparin gtt    - Diet: DASH 41 yo F with a PMH HTN, migraines, and kidney stones who presents with chest pain. At about 3:30 this morning after she woke up from a nap, she noticed a migraine typical of what she normally has. She took a dose of Sumatriptan as she normally does, and after, she developed severe chest discomfort, as a pressure across her entire anterior chest spreading down both her arms. She endorsed SOB. The symptoms improved after about 20 minutes. She decided to come to the ED later today. In the ED, she noted a milder chest tightness that also improved after about 15-20 minutes. Currently, she denies chest pain or SOB. ROS is also positive for a mild cough recently that has been improving, as well as palpitations that have been occurring for the past week. She has never had a stress test before. She does note recently that she has been having an "incredible hot feeling" on the sides of her head with her migraines that is not associated with blurry vision. In addition, she thinks the left side of her neck becomes stiff during these episodes.    She does note for the past 5 days, that she has sharp pains in her left flank about 1-2 times per day, lasting a few seconds at a time and 8/10 in severity. She has been perimenopausal for some time. She denies dysuria or hematuria. She says she is concerned the pains could be similar to her previous kidney stones.    In the ED, she was given aspirin 325 mg PO x1, Clopidogrel 300 mg PO x1, and started on a heparin gtt with bolus. Patient admitted with NSTEMI to Telemetry. Cardiology consulted. Patient had no events on Telemetry. Cardiac angiography done showing no obstruction, but consistent with small vessel disease or coronary vasospasm. Echo was obtained showing mid and apical segments hypokinetic and EF 40-45% consistent with Takotsubo/Stress Cardiomyopathy. Patient stated she has been going through an awful divorce over the past 2 years that has made her very stressed and upset. Patient denies shortness of breath, orthopnea, dyspnea on exertion, PND.         PHYSICAL EXAM:    Vital Signs Last 24 Hrs    T(C): 36.8 (29 Jun 2020 09:50), Max: 36.8 (29 Jun 2020 09:45)    T(F): 98.3 (29 Jun 2020 09:50), Max: 98.3 (29 Jun 2020 09:45)    HR: 78 (29 Jun 2020 12:50) (61 - 86)    BP: 118/71 (29 Jun 2020 12:50) (103/58 - 132/88)    BP(mean): --    RR: 16 (29 Jun 2020 12:50) (16 - 18)    SpO2: 100% (29 Jun 2020 12:50) (98% - 100%)        GENERAL: No acute distress    HEENT: PERRL, EOMI    NECK: Supple, no JVD, no thyromegaly    PULM: respirations non-labored, clear to auscultation bilaterally, no rales, rhonchi, or wheezes    CV: regular rate and rhythm, no murmurs, gallops, or rubs    GI: abdomen soft, nontender, nondistended, no masses felt, normal bowel sounds    MSK: no joint swelling, erythema, or warmth.    LYMPH: no anterior cervical, posterior cervical, supraclavicular, or inguinal lymphadenopathy    NEURO: A&Ox3, no tremors, sensation intact    SKIN: no rashes, lesions, or edema        Plan:    #NSTEMI    - With troponin now downtrending from 2.64 to 0.977    - Symptoms resolved    - Cardiology consult appreciated    - Given aspirin, clopidogrel, and started on a heparin gtt- discontinued    - Started high-intensity statin- atorvastatin    - C/w home beta blocker and ACEi    - Monitored on telemetry    - Continue to trend troponin, monitor for worsening symptoms    - Serial EKGs    - GALINDO score of 2, HEART score of 6.     - Coronary angiography to assess for CAD done today- no occlusions. Small vessel disease or coronary vasospasm    - Echo- Mid and Apical segments hypokinetic with EF 40-45%    - Switch Atenolol to Metoprolol to increase EF    - All patient questions/concerns answered        #Leukocytosis - pt non toxic appearing and afebrile. ? recently passed stone    - UA and CXR negative    - monitor cbc    - WBC downtrending        # HTN    - Atenolol 25 mg QD switched to Metoprolol 12.5mg QD    - C/w Enalapril 2.5 mg BID        #Migraines    - Avoid Sumatriptan for now due to increased risk for angina/SYEDA in [place]    - Increased Morphine dose         #Left flank pain    - Concerning for renal stones given patient's history    - Obtain UA/urine cx    - Discussed with patient that can order imaging if symptoms get worse, but for now will continue to monitor        # tob use - urged pt to quit and d/w her regarding her CV risk factors and increased risk for further cardiac events if she continues to smoke.  she plans to quit and does not want any NRT assistance        #Prophylactic measure    - DVT PPX: IMPROVE score of 0, but on heparin gtt    - Diet: DASH 41 yo F with a PMH HTN, migraines, and kidney stones who presents with chest pain. At about 3:30 this morning after she woke up from a nap, she noticed a migraine typical of what she normally has. She took a dose of Sumatriptan as she normally does, and after, she developed severe chest discomfort, as a pressure across her entire anterior chest spreading down both her arms. She endorsed SOB. The symptoms improved after about 20 minutes. She decided to come to the ED later today. In the ED, she noted a milder chest tightness that also improved after about 15-20 minutes. Currently, she denies chest pain or SOB. ROS is also positive for a mild cough recently that has been improving, as well as palpitations that have been occurring for the past week. She has never had a stress test before. She does note recently that she has been having an "incredible hot feeling" on the sides of her head with her migraines that is not associated with blurry vision. In addition, she thinks the left side of her neck becomes stiff during these episodes.    She does note for the past 5 days, that she has sharp pains in her left flank about 1-2 times per day, lasting a few seconds at a time and 8/10 in severity. She has been perimenopausal for some time. She denies dysuria or hematuria. She says she is concerned the pains could be similar to her previous kidney stones.    In the ED, she was given aspirin 325 mg PO x1, Clopidogrel 300 mg PO x1, and started on a heparin gtt with bolus. Patient admitted with NSTEMI to Telemetry. Cardiology consulted. Patient had no events on Telemetry. Cardiac angiography done showing no obstruction, but consistent with small vessel disease or coronary vasospasm. Echo was obtained showing mid and apical segments hypokinetic and EF 40-45% consistent with Takotsubo/Stress Cardiomyopathy. Patient stated she has been going through an awful divorce over the past 2 years that has made her very stressed and upset. Patient denies shortness of breath, orthopnea, dyspnea on exertion, PND.         PHYSICAL EXAM:    Vital Signs Last 24 Hrs    T(C): 36.8 (29 Jun 2020 09:50), Max: 36.8 (29 Jun 2020 09:45)    T(F): 98.3 (29 Jun 2020 09:50), Max: 98.3 (29 Jun 2020 09:45)    HR: 78 (29 Jun 2020 12:50) (61 - 86)    BP: 118/71 (29 Jun 2020 12:50) (103/58 - 132/88)    BP(mean): --    RR: 16 (29 Jun 2020 12:50) (16 - 18)    SpO2: 100% (29 Jun 2020 12:50) (98% - 100%)        GENERAL: No acute distress    HEENT: PERRL, EOMI    NECK: Supple, no JVD, no thyromegaly    PULM: respirations non-labored, clear to auscultation bilaterally, no rales, rhonchi, or wheezes    CV: regular rate and rhythm, no murmurs, gallops, or rubs    GI: abdomen soft, nontender, nondistended, no masses felt, normal bowel sounds    MSK: no joint swelling, erythema, or warmth.    LYMPH: no anterior cervical, posterior cervical, supraclavicular, or inguinal lymphadenopathy    NEURO: A&Ox3, no tremors, sensation intact    SKIN: no rashes, lesions, or edema        Plan        TYPE 2 [place] - this was more likely coronary vasospasm in setting of stress induced CM in setting of sumatriptan use    - case d/w dr mckeon and dr banerjee.  pt knows to avoid sumatriptan and will f/u neuro for further f/u    - cath with normal coronaries    - ECHO -  with decreased EF. will dc on ACEi and change BB to toprol XL based on cardio recs.  pt fits pattern for takasubo given she is going to divorce      - stop aspirin and statin as per cardio recs since this was not NSTEMI and not indicated in this patient     - With troponin now downtrending from 2.64 to 0.977    - no events on tele    - All patient questions/concerns answered        #Leukocytosis - pt non toxic appearing and afebrile. ? recently passed vs self limited viral illness    - UA and CXR negative    - monitor cbc    - WBC downtrending        # HTN    - Atenolol 25 mg QD switched to Metoprolol 12.5mg bid    - C/w Enalapril 2.5 mg BID        #Migraines    - Avoid Sumatriptan for now due to increased risk for angina/SYEDA in [place]    - Increased Morphine dose         #Left flank pain - resolved    - Concerning for renal stones given patient's history    - Obtain UA/urine cx    - Discussed with patient that can order imaging if symptoms get worse, but for now will continue to monitor        # tob use - urged pt to quit and d/w her regarding her CV risk factors and increased risk for further cardiac events if she continues to smoke.  she plans to quit and does not want any NRT assistance        #Prophylactic measure    - DVT PPX: IMPROVE score of 0, but on heparin gtt    - Diet: DASH        patient seen and examined with PA student Chitra Wyman I was physically present for the key portions of the evaluation and management (E/M) service provided.  I agree with the above history, physical, and plan which I have reviewed and edited where appropriate.    - changes made above    plan to dc home today and f/u with neuro and cardio

## 2020-06-29 NOTE — DISCHARGE NOTE NURSING/CASE MANAGEMENT/SOCIAL WORK - PATIENT PORTAL LINK FT
You can access the FollowMyHealth Patient Portal offered by [facility] by registering at the following website: [email or web address]. By joining [employer]’s FollowMyHealth portal, you will also be able to view your health information using other applications (apps) compatible with our system.

## 2020-06-29 NOTE — PROGRESS NOTE ADULT - SUBJECTIVE AND OBJECTIVE BOX
41 yo F with a PMH HTN, migraines, and kidney stones who presents with chest pain. At about 3:30 this morning after she woke up from a nap, she noticed a migraine typical of what she normally has. She took a dose of Sumatriptan as she normally does, and after, she developed severe chest discomfort, as a pressure across her entire anterior chest spreading down both her arms. She endorsed SOB. The symptoms improved after about 20 minutes. She decided to come to the ED later today. In the ED, she noted a milder chest tightness that also improved after about 15-20 minutes. Currently, she denies chest pain or SOB. ROS is also positive for a mild cough recently that has been improving, as well as palpitations that have been occurring for the past week. She has never had a stress test before.  She does note recently that she has been having an "incredible hot feeling" on the sides of her head with her migraines that is not associated with blurry vision. In addition, she thinks the left side of her neck becomes stiff during these episodes.    She does note for the past 5 days, that she has sharp pains in her left flank about 1-2 times per day, lasting a few seconds at a time and 8/10 in severity. She has been perimenopausal for some time. She denies dysuria or hematuria. She says she is concerned the pains could be similar to her previous kidney stones.    In the ED, she was given aspirin 325 mg PO x1, Clopidogrel 300 mg PO x1, and started on a heparin gtt with bolus.    6/28- Pt seen and examined. Patient complaining of migraine not relieved by morphine. Patient denies CP, sob  6/29- pt seen and examined. Patient feeling well this AM, but very nervous for todays procedure. Stated increased morphine helped with migraine yesterday, but took hours to resolve. Denies chest pain, SOB.    ROS:  Gen: + weight gain, malaise. Negative for fevers, chills or weight loss  Eyes: no blurred vision  ENT: no tinnitus or vertigo  Resp: No dyspnea, wheezing, hemoptysis, or orthopnea  CV: No chest pain, palpitations  GI: no nausea, vomiting, abdominal pain, or diarrhea  : no dysuria, hematuria, or incontinence  MSK: no flank pain. No joint swelling  Neuro: no headache. No focal deficits, confusion, tremors, or seizures  Skin: no rash, lesions, or edema    Physical Exam:  Vital Signs Last 24 Hrs  T(C): 36.8 (29 Jun 2020 09:50), Max: 36.8 (29 Jun 2020 09:45)  T(F): 98.3 (29 Jun 2020 09:50), Max: 98.3 (29 Jun 2020 09:45)  HR: 74 (29 Jun 2020 09:50) (61 - 81)  BP: 132/88 (29 Jun 2020 09:50) (103/58 - 132/88)  BP(mean): --  RR: 16 (29 Jun 2020 09:50) (16 - 18)  SpO2: 99% (29 Jun 2020 09:50) (98% - 100%)    GENERAL: No acute distress  HEENT: PERRL, EOMI  NECK: Supple, no JVD, no thyromegaly  PULM: respirations non-labored, clear to auscultation bilaterally, no rales, rhonchi, or wheezes  CV: regular rate and rhythm, no murmurs, gallops, or rubs  GI: abdomen soft, nontender, nondistended, no masses felt, normal bowel sounds  MSK: no joint swelling, erythema, or warmth.  LYMPH: no anterior cervical, posterior cervical, supraclavicular, or inguinal lymphadenopathy  NEURO: A&Ox3, no tremors, sensation intact  SKIN: no rashes, lesions, or edema    LABS:                        12.2   12.18 )-----------( 204      ( 29 Jun 2020 07:56 )             36.1   06-28    137  |  108  |  10  ----------------------------<  98  3.9   |  23  |  0.68    Ca    8.7      28 Jun 2020 06:29  Phos  3.1     06-28  Mg     1.8     06-28    TPro  7.6  /  Alb  3.8  /  TBili  0.5  /  DBili  x   /  AST  26  /  ALT  25  /  AlkPhos  90  06-27    PT/INR - ( 27 Jun 2020 14:57 )   PT: 11.6 sec;   INR: 1.04 ratio         PTT - ( 29 Jun 2020 07:56 )  PTT:51.3 sec      MEDICATIONS  (STANDING):  aspirin enteric coated 81 milliGRAM(s) Oral daily  ATENolol  Tablet 25 milliGRAM(s) Oral daily  atorvastatin 80 milliGRAM(s) Oral at bedtime  clopidogrel Tablet 75 milliGRAM(s) Oral daily  enalapril 2.5 milliGRAM(s) Oral two times a day  heparin  Infusion.  Unit(s)/Hr (7 mL/Hr) IV Continuous <Continuous>  multivitamin 1 Tablet(s) Oral daily    MEDICATIONS  (PRN):  acetaminophen   Tablet .. 650 milliGRAM(s) Oral every 6 hours PRN Mild Pain (1 - 3), Moderate Pain (4 - 6)  heparin   Injectable 3500 Unit(s) IV Push every 6 hours PRN For aPTT less than 40  morphine  - Injectable 3 milliGRAM(s) IV Push every 3 hours PRN Severe Pain (7 - 10)  ondansetron Injectable 4 milliGRAM(s) IV Push every 4 hours PRN Nausea and/or Vomiting

## 2020-06-29 NOTE — PROGRESS NOTE ADULT - ASSESSMENT
41 yo F with a PMH HTN, migraines, and kidney stones who presents with chest pain due to NSTEMI.    #NSTEMI  - With troponin now downtrending from 2.64 to 0.977  - Symptoms resolved  - Cardiology consult appreciated  - Given aspirin, clopidogrel, and started on a heparin gtt  - Started high-intensity statin- atorvastatin  - C/w home beta blocker and ACEi  - Monitor on telemetry  - Continue to trend troponin, monitor for worsening symptoms  - Serial EKGs  - GALINDO score of 2, HEART score of 6. Follow up Coronary angiography to assess for CAD done today  - All patient questions/concerns answered    #Leukocytosis - pt non toxic appearing and afebrile. ? recently passed stone  - UA and CXR negative  - monitor cbc  - Consult ID if no improvement    # HTN  - C/w Atenolol 25 mg QD  - C/w Enalapril 2.5 mg BID    #Migraines  - Avoid Sumatriptan for now due to increased risk for angina/SYEDA in [place]  - Increased Morphine dose     #Left flank pain  - Concerning for renal stones given patient's history  - Obtain UA/urine cx  - Discussed with patient that can order imaging if symptoms get worse, but for now will continue to monitor    # tob use - urged pt to quit and d/w her regarding her CV risk factors and increased risk for further cardiac events if she continues to smoke.  she plans to quit    #Prophylactic measure  - DVT PPX: IMPROVE score of 0, but on heparin gtt  - Diet: DASH  - Dispo: pending cardiology workup

## 2020-06-29 NOTE — DISCHARGE NOTE PROVIDER - NSDCCPCAREPLAN_GEN_ALL_CORE_FT
PRINCIPAL DISCHARGE DIAGNOSIS  Diagnosis: NSTEMI (non-ST elevated myocardial infarction)  Assessment and Plan of Treatment: Cardiac Catheterization showing small vessel disease or coronary vasospasm. No occlusions. No stent placement needed. Patient educated on the importance of smoking cessation. Patient to continue taking Aspirin and High dose Atorvastatin. Patient to follow up with Cardiologist Dr. Fernandez in 2 weeks. Patient advised to return to Emergency Department if chest pain returns, if shortness of breath, palpitations, lightheadedness, pain radiating down the left arm, nausea, or vomiting occur.      SECONDARY DISCHARGE DIAGNOSES  Diagnosis: Tobacco use disorder  Assessment and Plan of Treatment: Patient educated on the risks of smoking and the importance of cessation. Smoking has negative effects on the heart and can lead to CAD, COPD, Cancer and even death. Patient to follow up with PCP for discussion of use of Nicotine Replacement Therapy as an aide in smoking cessation. PRINCIPAL DISCHARGE DIAGNOSIS  Diagnosis: Coronary artery vasospasm  Assessment and Plan of Treatment: Cardiac Catheterization showing small vessel disease or coronary vasospasm in the setting of Sumitriptan use and underlying Takotsubo Cardiomyopathy. This is a decrease in wall motion of a portion of the heart and can be caused by stressful life events. Will need repeat Echocardiogram in the future to asses if this has resolved. Patient should try to decrease stress where possible. No occlusions. No stent placement needed. Patient educated on the importance of smoking cessation. Patient switched from Atenolol to Metoprolol. Patient to follow up with Cardiologist Dr. Fernandez in 2 weeks. Patient advised to return to Emergency Department if chest pain returns, if shortness of breath, palpitations, lightheadedness, pain radiating down the left arm, nausea, or vomiting occur.      SECONDARY DISCHARGE DIAGNOSES  Diagnosis: Tobacco use disorder  Assessment and Plan of Treatment: Patient educated on the risks of smoking and the importance of cessation. Smoking has negative effects on the heart and can lead to CAD, COPD, Cancer and even death. Did not want assistance in smoking at this time, but if assistance is needed, patient to follow up with PCP for discussion of use of Nicotine Replacement Therapy as an aide in smoking cessation.

## 2020-06-29 NOTE — DISCHARGE NOTE PROVIDER - CARE PROVIDERS DIRECT ADDRESSES
,DirectAddress_Unknown,[email or web address] ,DirectAddress_Unknown,[email or web address],DirectAddress_Unknown

## 2020-07-01 DIAGNOSIS — Z88.1 ALLERGY STATUS TO OTHER ANTIBIOTIC AGENTS STATUS: ICD-10-CM

## 2020-07-01 DIAGNOSIS — I21.A1 MYOCARDIAL INFARCTION TYPE 2: ICD-10-CM

## 2020-07-01 DIAGNOSIS — G43.909 MIGRAINE, UNSPECIFIED, NOT INTRACTABLE, WITHOUT STATUS MIGRAINOSUS: ICD-10-CM

## 2020-07-01 DIAGNOSIS — I10 ESSENTIAL (PRIMARY) HYPERTENSION: ICD-10-CM

## 2020-07-01 DIAGNOSIS — Z72.89 OTHER PROBLEMS RELATED TO LIFESTYLE: ICD-10-CM

## 2020-07-01 DIAGNOSIS — F17.210 NICOTINE DEPENDENCE, CIGARETTES, UNCOMPLICATED: ICD-10-CM

## 2020-07-01 DIAGNOSIS — D72.829 ELEVATED WHITE BLOOD CELL COUNT, UNSPECIFIED: ICD-10-CM

## 2020-07-01 DIAGNOSIS — F12.20 CANNABIS DEPENDENCE, UNCOMPLICATED: ICD-10-CM

## 2020-07-01 DIAGNOSIS — R10.9 UNSPECIFIED ABDOMINAL PAIN: ICD-10-CM

## 2020-07-06 PROBLEM — N20.0 CALCULUS OF KIDNEY: Chronic | Status: ACTIVE | Noted: 2020-06-27

## 2020-07-06 PROBLEM — G43.909 MIGRAINE, UNSPECIFIED, NOT INTRACTABLE, WITHOUT STATUS MIGRAINOSUS: Chronic | Status: ACTIVE | Noted: 2020-06-27

## 2020-07-28 DIAGNOSIS — G43.909 MIGRAINE, UNSPECIFIED, NOT INTRACTABLE, W/OUT STATUS MIGRAINOSUS: ICD-10-CM

## 2020-07-29 ENCOUNTER — NON-APPOINTMENT (OUTPATIENT)
Age: 45
End: 2020-07-29

## 2020-07-29 ENCOUNTER — APPOINTMENT (OUTPATIENT)
Dept: CARDIOLOGY | Facility: CLINIC | Age: 45
End: 2020-07-29
Payer: COMMERCIAL

## 2020-07-29 VITALS
OXYGEN SATURATION: 98 % | DIASTOLIC BLOOD PRESSURE: 78 MMHG | WEIGHT: 120 LBS | HEIGHT: 62 IN | HEART RATE: 79 BPM | BODY MASS INDEX: 22.08 KG/M2 | SYSTOLIC BLOOD PRESSURE: 114 MMHG

## 2020-07-29 PROCEDURE — 99215 OFFICE O/P EST HI 40 MIN: CPT

## 2020-07-29 PROCEDURE — 93000 ELECTROCARDIOGRAM COMPLETE: CPT

## 2020-07-29 RX ORDER — ATENOLOL 25 MG/1
25 TABLET ORAL
Qty: 30 | Refills: 0 | Status: DISCONTINUED | COMMUNITY
Start: 2018-01-29 | End: 2020-07-29

## 2020-07-29 RX ORDER — SUMATRIPTAN 25 MG/1
25 TABLET, FILM COATED ORAL
Refills: 0 | Status: DISCONTINUED | COMMUNITY
End: 2020-07-29

## 2020-07-29 RX ORDER — UBROGEPANT 100 MG/1
TABLET ORAL
Refills: 0 | Status: ACTIVE | COMMUNITY

## 2020-07-29 NOTE — REVIEW OF SYSTEMS
[Headache] : headache [Shortness Of Breath] : no shortness of breath [Dyspnea on exertion] : not dyspnea during exertion [Chest  Pressure] : no chest pressure [Chest Pain] : no chest pain [Cough] : cough [Palpitations] : no palpitations [Anxiety] : anxiety [Negative] : Heme/Lymph

## 2020-07-29 NOTE — DISCUSSION/SUMMARY
[___ Month(s)] : [unfilled] month(s) [With Me] : with me [FreeTextEntry1] : \par Recent NSTEMI:  Clinically doing well; likely related to acute coronary vasospasm - no longer taking sumatriptan; no epicardial coronary artery disease.\par \par Cardiomyopathy: Mild to moderate LV dysfunction; continue metoprolol and enalapril. Reassess left ventricular function with echocardiography 3 months post event.

## 2020-07-29 NOTE — PHYSICAL EXAM
[Normal Appearance] : normal appearance [Well Groomed] : well groomed [General Appearance - In No Acute Distress] : no acute distress [Eyelids - No Xanthelasma] : the eyelids demonstrated no xanthelasmas [FreeTextEntry1] : No JVD [Respiration, Rhythm And Depth] : normal respiratory rhythm and effort [Heart Rate And Rhythm] : heart rate and rhythm were normal [Auscultation Breath Sounds / Voice Sounds] : lungs were clear to auscultation bilaterally [Heart Sounds] : normal S1 and S2 [Edema] : no peripheral edema present [Bowel Sounds] : normal bowel sounds [Abnormal Walk] : normal gait [Nail Clubbing] : no clubbing of the fingernails [] : no rash [Cyanosis, Localized] : no localized cyanosis [Impaired Insight] : insight and judgment were intact [Oriented To Time, Place, And Person] : oriented to person, place, and time [Affect] : the affect was normal [Mood] : the mood was normal

## 2020-07-29 NOTE — HISTORY OF PRESENT ILLNESS
[FreeTextEntry1] : Mireya Gilliland is a 42 year old woman with a history of HTN, renal stones, and migraine headaches who I met in cardiology consultation on June 28, 2020 when she presented to the ER after experiencing chest discomfort that occurred after she took sumatriptan for the treatment of an acute migraine.  She was diagnosed with an acute NSTEMI with peak troponin 2.640.  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  She has been feeling well since hospital discharge - nail on a new regimen for migraine headaches.  She has not had a recurrence of chest discomfort and has been slowly increasing her activities. She denies angina, dyspnea.\par \par * Note: In preparation for today's visit I reviewed the hospital chart and summarized findings above.

## 2020-07-29 NOTE — REASON FOR VISIT
[Follow-Up - From Hospitalization] : follow-up of a recent hospitalization for [FreeTextEntry1] : NSTEMI

## 2020-09-10 ENCOUNTER — APPOINTMENT (OUTPATIENT)
Dept: CARDIOLOGY | Facility: CLINIC | Age: 45
End: 2020-09-10

## 2020-09-28 ENCOUNTER — APPOINTMENT (OUTPATIENT)
Dept: CARDIOLOGY | Facility: CLINIC | Age: 45
End: 2020-09-28
Payer: COMMERCIAL

## 2020-09-28 PROCEDURE — 93306 TTE W/DOPPLER COMPLETE: CPT

## 2020-10-07 ENCOUNTER — APPOINTMENT (OUTPATIENT)
Dept: CARDIOLOGY | Facility: CLINIC | Age: 45
End: 2020-10-07
Payer: COMMERCIAL

## 2020-10-07 ENCOUNTER — NON-APPOINTMENT (OUTPATIENT)
Age: 45
End: 2020-10-07

## 2020-10-07 VITALS
SYSTOLIC BLOOD PRESSURE: 141 MMHG | HEIGHT: 62 IN | DIASTOLIC BLOOD PRESSURE: 91 MMHG | OXYGEN SATURATION: 100 % | WEIGHT: 119 LBS | BODY MASS INDEX: 21.9 KG/M2 | HEART RATE: 79 BPM

## 2020-10-07 PROCEDURE — 93000 ELECTROCARDIOGRAM COMPLETE: CPT

## 2020-10-07 PROCEDURE — 99214 OFFICE O/P EST MOD 30 MIN: CPT

## 2020-10-07 RX ORDER — ENALAPRIL MALEATE 2.5 MG/1
2.5 TABLET ORAL TWICE DAILY
Refills: 0 | Status: DISCONTINUED | COMMUNITY
End: 2020-10-07

## 2020-10-07 RX ORDER — LOSARTAN POTASSIUM 50 MG/1
50 TABLET, FILM COATED ORAL DAILY
Qty: 90 | Refills: 1 | Status: ACTIVE | COMMUNITY
Start: 2020-10-07 | End: 1900-01-01

## 2020-10-07 NOTE — REASON FOR VISIT
[Follow-Up - Clinic] : a clinic follow-up of [Medication Management] : Medication management [FreeTextEntry1] : NSTEMI

## 2020-10-07 NOTE — HISTORY OF PRESENT ILLNESS
[FreeTextEntry1] : Mireya Gilliland is a 45 year old woman with a history of NSTEMI (peak troponin 2.64) in June 2020 attributed to sumatriptan-induced coronary vasospasm and with associated cardiomyopathy, HTN, renal stones, and migraine headaches who returns for cardiac examination.  She has been feeling well from a cardiac standpoint with no angina or dyspnea; tolerating prescribed pharmacotherapy but describes a cough which she thinks is related to her use of enalapril.

## 2020-10-07 NOTE — DISCUSSION/SUMMARY
[___ Month(s)] : [unfilled] month(s) [With Me] : with me [FreeTextEntry1] : \par Recent NSTEMI:  No recurrence of chest discomfort; tolerating pharmacotherapy.\par \par Cardiomyopathy: Previously seen mild to moderate LV dysfunction has improved with time and medical therapy -- recent echocardiogram without segmental wall motion abnormality; continue metoprolol.\par \par Abnormal ECG: ECG during our last encounter had deep T-wave inversions -- no longer present on today's tracing.\par \par Hypertension: Patient may have a cough related to ACE inhibition; stop enalapril and start losartan -- she will check blood pressure at home and at an upcoming visit with her primary care physician following this change.

## 2020-10-07 NOTE — REVIEW OF SYSTEMS
[Headache] : headache [Cough] : cough [Negative] : Heme/Lymph [Shortness Of Breath] : no shortness of breath [Dyspnea on exertion] : not dyspnea during exertion [Chest  Pressure] : no chest pressure [Chest Pain] : no chest pain [Palpitations] : no palpitations [see HPI] : see HPI [Under Stress] : under stress

## 2020-11-24 ENCOUNTER — APPOINTMENT (OUTPATIENT)
Dept: OBGYN | Facility: CLINIC | Age: 45
End: 2020-11-24
Payer: COMMERCIAL

## 2020-11-24 VITALS
HEIGHT: 62 IN | WEIGHT: 126 LBS | BODY MASS INDEX: 23.19 KG/M2 | TEMPERATURE: 97.7 F | DIASTOLIC BLOOD PRESSURE: 72 MMHG | SYSTOLIC BLOOD PRESSURE: 110 MMHG

## 2020-11-24 PROCEDURE — 99396 PREV VISIT EST AGE 40-64: CPT

## 2020-11-24 NOTE — DISCUSSION/SUMMARY
[FreeTextEntry1] : Health Maintenance:\par Pap today with HPV. Will go to every other year\par TBSE\par Mammo Rx.\par Guiac neg. Colonoscopy by age 49\par Vit D 1000 IUs daily, calcium of 1100mg daily thru diet of dark green leafy vegetables, low-fat milk products and exercise TIW.\par \par Contraception and cycle control:\par -bleeding 5 days lightly\par -placed 6/2017\par -could use longer than 5 yrs \par -check hormones age 50\par -not currently active, no signs of infection

## 2020-11-24 NOTE — PHYSICAL EXAM
[Appropriately responsive] : appropriately responsive [Alert] : alert [No Acute Distress] : no acute distress [No Lymphadenopathy] : no lymphadenopathy [No Murmurs] : no murmurs [Soft] : soft [Non-tender] : non-tender [Non-distended] : non-distended [No HSM] : No HSM [No Lesions] : no lesions [No Mass] : no mass [Oriented x3] : oriented x3 [Examination Of The Breasts] : a normal appearance [No Masses] : no breast masses were palpable [Labia Majora] : normal [Labia Minora] : normal [IUD String] : an IUD string was noted [Normal] : normal [Uterine Adnexae] : normal [FreeTextEntry9] : Radha neg

## 2020-11-24 NOTE — HISTORY OF PRESENT ILLNESS
[FreeTextEntry1] : 44 yo  with LMP 11/15/20 for annual.\par Mammo  required left breast US. BIRAD 1 \par  [Mammogramdate] : 2/2020 [TextBox_19] : left breast US, then BIRAD 1 [BreastSonogramDate] : 3/2020 [TextBox_25] : normal [PapSmeardate] : 11/21/19 [TextBox_31] : neg [BoneDensityDate] : None [ColonoscopyDate] : None

## 2020-11-25 LAB — HPV HIGH+LOW RISK DNA PNL CVX: NOT DETECTED

## 2021-03-24 ENCOUNTER — APPOINTMENT (OUTPATIENT)
Dept: CARDIOLOGY | Facility: CLINIC | Age: 46
End: 2021-03-24
Payer: COMMERCIAL

## 2021-03-24 ENCOUNTER — NON-APPOINTMENT (OUTPATIENT)
Age: 46
End: 2021-03-24

## 2021-03-24 VITALS
WEIGHT: 137 LBS | SYSTOLIC BLOOD PRESSURE: 139 MMHG | BODY MASS INDEX: 25.21 KG/M2 | DIASTOLIC BLOOD PRESSURE: 77 MMHG | TEMPERATURE: 98.2 F | HEART RATE: 73 BPM | HEIGHT: 62 IN | OXYGEN SATURATION: 99 %

## 2021-03-24 VITALS — DIASTOLIC BLOOD PRESSURE: 82 MMHG | SYSTOLIC BLOOD PRESSURE: 128 MMHG

## 2021-03-24 PROCEDURE — 99072 ADDL SUPL MATRL&STAF TM PHE: CPT

## 2021-03-24 PROCEDURE — 93000 ELECTROCARDIOGRAM COMPLETE: CPT

## 2021-03-24 PROCEDURE — 99214 OFFICE O/P EST MOD 30 MIN: CPT

## 2021-03-24 RX ORDER — SUMATRIPTAN 100 MG/1
100 TABLET, FILM COATED ORAL
Qty: 9 | Refills: 0 | Status: DISCONTINUED | COMMUNITY
Start: 2019-11-04 | End: 2021-03-24

## 2021-03-24 NOTE — REVIEW OF SYSTEMS
[Headache] : headache [see HPI] : see HPI [Under Stress] : under stress [Negative] : Heme/Lymph [Recent Weight Gain (___ Lbs)] : recent [unfilled] ~Ulb weight gain [Feeling Fatigued] : not feeling fatigued [Shortness Of Breath] : no shortness of breath [Dyspnea on exertion] : not dyspnea during exertion [Chest  Pressure] : no chest pressure [Chest Pain] : no chest pain [Palpitations] : no palpitations [Cough] : no cough [Wheezing] : no wheezing

## 2021-03-24 NOTE — DISCUSSION/SUMMARY
[___ Month(s)] : [unfilled] month(s) [With Me] : with me [FreeTextEntry1] : \par S/p NSTEMI:  Event occurred in June 2020 and was attributed to coronary vasospasm in the setting of -triptan use for migraine; no coronary artery disease on angiography.\par \par Cardiomyopathy: Transient vasospasm induced cardiomyopathy has resolved; similarly, previously seen deep and diffuse T-wave inversions on electrocardiogram have resolved. Continue metoprolol and losartan.\par \par Hypertension: Tolerating metoprolol and losartan -- she did experience an ACE-I-induced cough and is doing much better with losartan; blood pressure has been satisfactorily controlled; we discussed the importance of exercise.\par

## 2021-03-24 NOTE — PHYSICAL EXAM
[Normal Appearance] : normal appearance [Well Groomed] : well groomed [General Appearance - In No Acute Distress] : no acute distress [Respiration, Rhythm And Depth] : normal respiratory rhythm and effort [Auscultation Breath Sounds / Voice Sounds] : lungs were clear to auscultation bilaterally [Heart Rate And Rhythm] : heart rate and rhythm were normal [Heart Sounds] : normal S1 and S2 [Edema] : no peripheral edema present [Abnormal Walk] : normal gait [Nail Clubbing] : no clubbing of the fingernails [Cyanosis, Localized] : no localized cyanosis [] : no rash [Oriented To Time, Place, And Person] : oriented to person, place, and time [Impaired Insight] : insight and judgment were intact [Affect] : the affect was normal [Mood] : the mood was normal [Eyelids - No Xanthelasma] : the eyelids demonstrated no xanthelasmas [FreeTextEntry1] : Wearing a face mask

## 2021-03-24 NOTE — REASON FOR VISIT
[Follow-Up - Clinic] : a clinic follow-up of [Hypertension] : hypertension [Medication Management] : Medication management [FreeTextEntry1] : s./p NSTEMI

## 2021-03-24 NOTE — HISTORY OF PRESENT ILLNESS
[FreeTextEntry1] : Mireya Gilliland is a 45 year old woman with a history of NSTEMI (peak troponin 2.64) in June 2020 attributed to sumatriptan-induced coronary vasospasm and with associated cardiomyopathy (now resolved), HTN, cardiac valvular insufficiencies (mild mitral regurgitation, minimal aortic regurgitation, mild to moderate tricuspid regurgitation), renal stones, and migraine headaches who returns for cardiac examination - our last encounter was in October 2020. At that time she described a cough and I changed enalapril to losartan.  Cough has completely resolved.  She has been feeling well -- no angina or dyspnea; weight gain; sedentary.

## 2021-04-20 ENCOUNTER — RX RENEWAL (OUTPATIENT)
Age: 46
End: 2021-04-20

## 2021-08-08 NOTE — ED PROVIDER NOTE - ENMT, MLM
negative... Airway patent, Nasal mucosa clear. Mouth with normal mucosa. Throat has no vesicles, no oropharyngeal exudates and uvula is midline.

## 2021-08-17 ENCOUNTER — APPOINTMENT (OUTPATIENT)
Dept: MAMMOGRAPHY | Facility: CLINIC | Age: 46
End: 2021-08-17

## 2021-09-29 ENCOUNTER — APPOINTMENT (OUTPATIENT)
Dept: CARDIOLOGY | Facility: CLINIC | Age: 46
End: 2021-09-29
Payer: COMMERCIAL

## 2021-09-29 ENCOUNTER — NON-APPOINTMENT (OUTPATIENT)
Age: 46
End: 2021-09-29

## 2021-09-29 VITALS
HEIGHT: 62 IN | OXYGEN SATURATION: 98 % | SYSTOLIC BLOOD PRESSURE: 136 MMHG | DIASTOLIC BLOOD PRESSURE: 84 MMHG | HEART RATE: 69 BPM | BODY MASS INDEX: 24.29 KG/M2 | WEIGHT: 132 LBS

## 2021-09-29 VITALS — DIASTOLIC BLOOD PRESSURE: 74 MMHG | SYSTOLIC BLOOD PRESSURE: 122 MMHG

## 2021-09-29 DIAGNOSIS — Z86.79 PERSONAL HISTORY OF OTHER DISEASES OF THE CIRCULATORY SYSTEM: ICD-10-CM

## 2021-09-29 PROCEDURE — 93000 ELECTROCARDIOGRAM COMPLETE: CPT

## 2021-09-29 PROCEDURE — 99214 OFFICE O/P EST MOD 30 MIN: CPT

## 2021-09-29 RX ORDER — BUTALBITAL, ACETAMINOPHEN AND CAFFEINE 325; 50; 40 MG/1; MG/1; MG/1
TABLET ORAL EVERY 4 HOURS
Refills: 0 | Status: DISCONTINUED | COMMUNITY
End: 2021-09-29

## 2021-09-29 NOTE — REVIEW OF SYSTEMS
[SOB] : no shortness of breath [Dyspnea on exertion] : not dyspnea during exertion [Chest Discomfort] : no chest discomfort [Palpitations] : palpitations

## 2021-09-29 NOTE — PHYSICAL EXAM
[Normal Appearance] : normal appearance [Well Groomed] : well groomed [General Appearance - In No Acute Distress] : no acute distress [Eyelids - No Xanthelasma] : the eyelids demonstrated no xanthelasmas [Respiration, Rhythm And Depth] : normal respiratory rhythm and effort [Auscultation Breath Sounds / Voice Sounds] : lungs were clear to auscultation bilaterally [Heart Rate And Rhythm] : heart rate and rhythm were normal [Heart Sounds] : normal S1 and S2 [Edema] : no peripheral edema present [Abnormal Walk] : normal gait [Nail Clubbing] : no clubbing of the fingernails [Cyanosis, Localized] : no localized cyanosis [] : no rash [Oriented To Time, Place, And Person] : oriented to person, place, and time [Impaired Insight] : insight and judgment were intact [Affect] : the affect was normal [Mood] : the mood was normal [FreeTextEntry1] : Wearing a face mask

## 2021-09-29 NOTE — DISCUSSION/SUMMARY
[With Me] : with me [___ Year(s)] : in [unfilled] year(s) [FreeTextEntry1] : \par Coronary artery vasospasm: Single event in 2020 attributed to triptan use for chronic migraines with coronary angiography revealing normal arteries; no recurrence; clinically doing well.\par \par Cardiomyopathy: Complete resolution of previously seen mild LV dysfunction and diffuse T-wave inversions on ECG; continue losartan; taper and then discontinue metoprolol.\par \par Hypertension: Satisfactory control; continue losartan 50 mg daily.\par

## 2021-09-29 NOTE — HISTORY OF PRESENT ILLNESS
[FreeTextEntry1] : Mireya Gilliland is a 46 year old woman with a history of NSTEMI (peak troponin 2.64) in June 2020 attributed to sumatriptan-induced coronary vasospasm and with associated cardiomyopathy (resolved), HTN, cardiac valvular insufficiencies (mild mitral regurgitation, minimal aortic regurgitation, mild to moderate tricuspid regurgitation), renal stones, and migraine headaches who returns for cardiac examination.  She continues to feel well - no chest discomfort or dyspnea; occasional palpitations. She would like to discontinue metoprolol.

## 2021-11-23 ENCOUNTER — TRANSCRIPTION ENCOUNTER (OUTPATIENT)
Age: 46
End: 2021-11-23

## 2022-01-14 ENCOUNTER — APPOINTMENT (OUTPATIENT)
Dept: OBGYN | Facility: CLINIC | Age: 47
End: 2022-01-14
Payer: COMMERCIAL

## 2022-01-14 DIAGNOSIS — Z97.5 PRESENCE OF (INTRAUTERINE) CONTRACEPTIVE DEVICE: ICD-10-CM

## 2022-01-14 PROCEDURE — 99396 PREV VISIT EST AGE 40-64: CPT

## 2022-01-14 NOTE — DISCUSSION/SUMMARY
[FreeTextEntry1] : Health Maintenance:\par -Pap with HPV\par -Mammo Rx\par -TBSE\par -colonoscopy guidelines reviewed with patient. Guiac negative today\par -Achieve Vit D levels of 30-40, intake of 1100 mg daily calcium mostly thru dark green\par leafy greens and milk products, exercise 30 minutes TIW\par

## 2022-01-14 NOTE — PHYSICAL EXAM
[Appropriately responsive] : appropriately responsive [Alert] : alert [No Acute Distress] : no acute distress [No Lymphadenopathy] : no lymphadenopathy [Soft] : soft [Non-tender] : non-tender [Non-distended] : non-distended [No HSM] : No HSM [No Lesions] : no lesions [No Mass] : no mass [Oriented x3] : oriented x3 [Examination Of The Breasts] : a normal appearance [No Masses] : no breast masses were palpable [Labia Majora] : normal [Labia Minora] : normal [IUD String] : an IUD string was noted [Normal] : normal [Uterine Adnexae] : normal [FreeTextEntry9] : Radha negative

## 2022-01-14 NOTE — HISTORY OF PRESENT ILLNESS
[FreeTextEntry1] : 47 yo  with lMP 22 for annual.\par \par Menarche 15\par 2017 IUD placed\par menses persist at 5 days\par \par OB:\par C/S Robson  M\par \par HTN\par cardiomyopathy in  rsolved\par Mild MVP\par \par  [Mammogramdate] : 8/6/21 [TextBox_19] : BIRAD 1 [PapSmeardate] : 11/24/20 [TextBox_31] : Pap and HPV neg

## 2022-01-16 LAB — HPV HIGH+LOW RISK DNA PNL CVX: NOT DETECTED

## 2022-02-03 ENCOUNTER — NON-APPOINTMENT (OUTPATIENT)
Age: 47
End: 2022-02-03

## 2022-05-10 NOTE — ED ADULT NURSE NOTE - CAS EDN DISCHARGE ASSESSMENT
[de-identified] : Brain MRI was performed on 2/13/19 at [facility].\par The study demonstrated a chronic lacunar infarct in the left superior frontal region.\par There is also some chronic ischemic change. [de-identified] : Nerve conduction studies demonstrated some peripheral neuropathy likely secondary to her diabetes.\par \par MRI of the lumbar spine was performed at [facility] on 2/5/18. \par The study demonstrated multilevel degenerative changes and disc bulges.\par  Alert and oriented to person, place and time

## 2022-09-21 ENCOUNTER — APPOINTMENT (OUTPATIENT)
Dept: CARDIOLOGY | Facility: CLINIC | Age: 47
End: 2022-09-21

## 2022-09-21 ENCOUNTER — NON-APPOINTMENT (OUTPATIENT)
Age: 47
End: 2022-09-21

## 2022-09-21 VITALS
HEART RATE: 84 BPM | WEIGHT: 130.2 LBS | OXYGEN SATURATION: 99 % | HEIGHT: 62 IN | SYSTOLIC BLOOD PRESSURE: 124 MMHG | BODY MASS INDEX: 23.96 KG/M2 | DIASTOLIC BLOOD PRESSURE: 70 MMHG

## 2022-09-21 DIAGNOSIS — Z86.79 PERSONAL HISTORY OF OTHER DISEASES OF THE CIRCULATORY SYSTEM: ICD-10-CM

## 2022-09-21 DIAGNOSIS — I21.4 NON-ST ELEVATION (NSTEMI) MYOCARDIAL INFARCTION: ICD-10-CM

## 2022-09-21 DIAGNOSIS — I25.2 OLD MYOCARDIAL INFARCTION: ICD-10-CM

## 2022-09-21 PROCEDURE — 99214 OFFICE O/P EST MOD 30 MIN: CPT | Mod: 25

## 2022-09-21 PROCEDURE — 93000 ELECTROCARDIOGRAM COMPLETE: CPT

## 2022-09-21 RX ORDER — METOPROLOL SUCCINATE 25 MG/1
25 TABLET, EXTENDED RELEASE ORAL DAILY
Qty: 30 | Refills: 1 | Status: DISCONTINUED | COMMUNITY
End: 2022-09-21

## 2022-09-21 RX ORDER — NAPROXEN SODIUM 550 MG/1
550 TABLET ORAL
Refills: 0 | Status: DISCONTINUED | COMMUNITY
End: 2022-09-21

## 2022-09-21 NOTE — PHYSICAL EXAM
[Normal S1, S2] : normal S1, S2 [No Murmur] : no murmur [Clear Lung Fields] : clear lung fields [Normal Gait] : normal gait [No Edema] : no edema [Alert and Oriented] : alert and oriented [de-identified] : Appears her stated age and well, normal body mass index [de-identified] :  extraocular muscles intact [de-identified] :  wearing a facemask

## 2022-09-21 NOTE — DISCUSSION/SUMMARY
[With Me] : with me [___ Year(s)] : in [unfilled] year(s) [FreeTextEntry1] : \par  hypertension: Well-controlled; continue   Losartan\par \par History of subendocardial infarct: In 2020 she suffered a subendocardial infarct attributed to vasospasm from a triptan  she used intermittently for migraine headache disorder --  the event was associated with an abnormal ECG (diffuse T wave abnormality), small elevation of troponin, and transient LV dysfunction ( all resolved)l\par

## 2022-09-21 NOTE — CARDIOLOGY SUMMARY
[de-identified] : 9/21/22.  Normal sinus rhythm [de-identified] : 9/28/20, Normal left ventricular size and systolic function. Mild diastolic dysfunction. Normal right ventricular size and function. Mild mitral regurgitation. Minimal aortic regurgitation. Mild to moderate tricuspid regurgitation. Borderline pulmonary hypertension. LVEF 59% [de-identified] : 6/29/20, Normal coronary arteries. Findings are consistent with small vessel disease or coronary spasm

## 2022-09-21 NOTE — HISTORY OF PRESENT ILLNESS
[FreeTextEntry1] : Mireya Gilliland is a 47-year-old woman with a history of NSTEMI in June 2020 attributed to sumatriptan-induced coronary vasospasm and with associated cardiomyopathy (resolved), HTN, cardiac valvular insufficiencies (mild mitral regurgitation, minimal aortic regurgitation, mild to moderate tricuspid regurgitation), renal stones, and migraine headaches who returns for cardiac examination.  Following our last encounter in September 2021 she tapered and ultimately discontinued metoprolol.  She has been feeling well -- no complaints; less active (too busy) but feeling well.\par  Cheek Interpolation Flap Text: A decision was made to reconstruct the defect utilizing an interpolation axial flap and a staged reconstruction.  A telfa template was made of the defect.  This telfa template was then used to outline the Cheek Interpolation flap.  The donor area for the pedicle flap was then injected with anesthesia.  The flap was excised through the skin and subcutaneous tissue down to the layer of the underlying musculature.  The interpolation flap was carefully excised within this deep plane to maintain its blood supply.  The edges of the donor site were undermined.   The donor site was closed in a primary fashion.  The pedicle was then rotated into position and sutured.  Once the tube was sutured into place, adequate blood supply was confirmed with blanching and refill.  The pedicle was then wrapped with xeroform gauze and dressed appropriately with a telfa and gauze bandage to ensure continued blood supply and protect the attached pedicle.

## 2022-10-14 NOTE — ED ADULT NURSE NOTE - NS PRO PASSIVE SMOKE EXP
Unknown Depth Of Tumor Invasion (For Histology): tumor not visualized (deep and peripheral margins are clear of tumor)

## 2022-12-05 NOTE — ED PROVIDER NOTE - CADM POA CENTRAL LINE
Airway patent, Nasal mucosa clear. Mouth with normal mucosa. Throat has no vesicles, no oropharyngeal exudates and uvula is midline. No Airway patent, Nasal mucosa clear. Mouth with normal mucosa. Throat has no vesicles, no oropharyngeal exudates and uvula is midline. No epistaxis.

## 2023-01-17 ENCOUNTER — TRANSCRIPTION ENCOUNTER (OUTPATIENT)
Age: 48
End: 2023-01-17

## 2023-01-17 ENCOUNTER — APPOINTMENT (OUTPATIENT)
Dept: OBGYN | Facility: CLINIC | Age: 48
End: 2023-01-17
Payer: COMMERCIAL

## 2023-01-17 VITALS
HEIGHT: 62 IN | SYSTOLIC BLOOD PRESSURE: 130 MMHG | WEIGHT: 131 LBS | BODY MASS INDEX: 24.11 KG/M2 | DIASTOLIC BLOOD PRESSURE: 80 MMHG

## 2023-01-17 DIAGNOSIS — Z12.39 ENCOUNTER FOR OTHER SCREENING FOR MALIGNANT NEOPLASM OF BREAST: ICD-10-CM

## 2023-01-17 PROCEDURE — 99396 PREV VISIT EST AGE 40-64: CPT

## 2023-01-29 NOTE — DISCUSSION/SUMMARY
[FreeTextEntry1] : Health Maintenance: \par \par -Pap with HPV\par -Mammo Rx\par -TBSE\par -colonoscopy guidelines reviewed with patient\par -Achieve Vit D levels of 30-40, intake of 1100 mg daily calcium mostly thru dark green\par leafy greens and milk products, exercise 30 minutes TIW \par \par Pt given names of colorectal specialists- she will call and make appointment\par Importance of colon cancer screening discussed

## 2023-01-29 NOTE — HISTORY OF PRESENT ILLNESS
[FreeTextEntry1] : 48yo  with a ? LMP secondary to Mirena IUD\par \par Menarche at approx 12-11yo\par Mirena inserted 2017 \par \par OB: \par C/S Robson - \par \par HTN\par Cardiomyopathy 21'- resolved\par Mild MVP \par \par She is not sexually active [Mammogramdate] : 8/2021 [TextBox_19] : BIRADS 1 [PapSmeardate] : 1/2022 [TextBox_43] : Not yet

## 2023-01-29 NOTE — PHYSICAL EXAM
[Appropriately responsive] : appropriately responsive [Alert] : alert [No Acute Distress] : no acute distress [No Lymphadenopathy] : no lymphadenopathy [Soft] : soft [Non-tender] : non-tender [Non-distended] : non-distended [No HSM] : No HSM [No Lesions] : no lesions [No Mass] : no mass [Oriented x3] : oriented x3 [Examination Of The Breasts] : a normal appearance [No Masses] : no breast masses were palpable [Labia Majora] : normal [Labia Minora] : normal [IUD String] : an IUD string was noted [Normal] : normal [Uterine Adnexae] : normal [FreeTextEntry5] : Pap taken  [FreeTextEntry9] : Stool guiac neg

## 2023-02-02 LAB
BACTERIA UR CULT: NORMAL
BILIRUB UR QL STRIP: NORMAL
CYTOLOGY CVX/VAG DOC THIN PREP: NORMAL
GLUCOSE UR-MCNC: NORMAL
HCG UR QL: 0.2 EU/DL
HGB UR QL STRIP.AUTO: NORMAL
HPV HIGH+LOW RISK DNA PNL CVX: NOT DETECTED
KETONES UR-MCNC: NORMAL
LEUKOCYTE ESTERASE UR QL STRIP: NORMAL
NITRITE UR QL STRIP: NORMAL
PH UR STRIP: 6
PROT UR STRIP-MCNC: NORMAL
SP GR UR STRIP: 1.02

## 2023-02-03 ENCOUNTER — NON-APPOINTMENT (OUTPATIENT)
Age: 48
End: 2023-02-03

## 2023-02-03 ENCOUNTER — APPOINTMENT (OUTPATIENT)
Dept: DERMATOLOGY | Facility: CLINIC | Age: 48
End: 2023-02-03
Payer: COMMERCIAL

## 2023-02-03 DIAGNOSIS — D48.5 NEOPLASM OF UNCERTAIN BEHAVIOR OF SKIN: ICD-10-CM

## 2023-02-03 DIAGNOSIS — D22.5 MELANOCYTIC NEVI OF TRUNK: ICD-10-CM

## 2023-02-03 PROCEDURE — 11102 TANGNTL BX SKIN SINGLE LES: CPT

## 2023-02-03 PROCEDURE — 99203 OFFICE O/P NEW LOW 30 MIN: CPT | Mod: 25

## 2023-02-03 NOTE — PHYSICAL EXAM
[FreeTextEntry3] : + lentigines and solar damage are present in sun exposed areas;\par \par Left posterior shoulder; \par fleshy red brown papule;  \par \par \par R nose ala;  pearly papule with telangiectasiae and small erosion

## 2023-02-03 NOTE — ASSESSMENT
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

## 2023-02-22 LAB — CORE LAB BIOPSY: NORMAL

## 2023-03-28 ENCOUNTER — APPOINTMENT (OUTPATIENT)
Dept: DERMATOLOGY | Facility: CLINIC | Age: 48
End: 2023-03-28
Payer: COMMERCIAL

## 2023-03-28 PROCEDURE — 17282 DSTR MAL LS F/E/E/N/L/M1.1-2: CPT

## 2023-05-24 NOTE — ASU PATIENT PROFILE, ADULT - CAREGIVER
Physical Therapy Visit    Visit Type: Daily Treatment Note  Visit: 2  Referring Provider: Marlo Martines MD  Medical Diagnosis (from order): Diagnosis Information    Diagnosis  723.1 (ICD-9-CM) - M54.2 (ICD-10-CM) - Neck pain  724.3 (ICD-9-CM) - M54.40 (ICD-10-CM) - Low back pain with sciatica, sciatica laterality unspecified, unspecified back pain laterality, unspecified chronicity         SUBJECTIVE                                                                                                               Patient reports increased familiar symptoms in neck following previous session, however, patient states that the symptoms were improved overall the next day. Good adherence to home exercise program.    3/10  1.5/10 after ultrasound (US)      OBJECTIVE                                                                                                                                       Treatment    Ultrasound (74492)  - Position: sitting  - Duty Cycle: 100%  - Frequency: 3 Mhz  - Intensity (w/cm2): 1.2     - Intensity: patient comfort  - Duration: 8 minutes    Results: decreased pain and improved range of motion  Reaction: no adverse reaction to treatment      Therapeutic Exercise  Review of home exercise program (HEP).    - Seated Scapular Retraction - 1-2 sets - 10 reps - 3 hold    - Gentle Levator Scapulae Stretch - 2-3 reps - 30 hold    Manual Therapy   Soft Tissue Mobilization (STM) - Cervical Paraspinals, Upper Trapezius, Levator Scapulae  Mobilizations - Cervical - Up glides, Lateral glides    Neuromuscular Re-Education  - Scapular Wall Slides - 10 reps  - Prone Single Arm Shoulder Y with Dumbbell - 2 sets - 10 reps  - Prone Single Arm Shoulder Horizontal Abduction with Dumbbell - Palm Down - 2 sets - 10 reps    - General Posture Education throughout session    Skilled input: verbal instruction/cues, tactile instruction/cues and demonstration    Writer verbally educated and received verbal consent for hand  placement, positioning of patient, and techniques to be performed today from patient for clothing adjustments for techniques, hand placement and palpation for techniques and therapist position for techniques as described above and how they are pertinent to the patient's plan of care.  Home Exercise Program  Access Code: [email or web address]  URL: [email or web address]  Date: 05/24/2023  Prepared by: Candido Slaughter    Exercises  - Supine Lower Trunk Rotation  - 2 x daily - 7 x weekly - 1-2 sets - 10 reps  - Supine Piriformis Stretch with Foot on Ground  - 2 x daily - 7 x weekly - 2-3 reps - 30 hold  - Supine Cervical Rotation AROM on Pillow  - 2 x daily - 7 x weekly - 1-2 sets - 10 reps  - Seated Scapular Retraction  - 2 x daily - 7 x weekly - 1-2 sets - 10 reps - 3 hold  - Gentle Levator Scapulae Stretch  - 2 x daily - 7 x weekly - 2-3 reps - 30 hold  - Scapular Wall Slides  - 2 x daily - 10 reps  - Prone Single Arm Shoulder Y with Dumbbell  - 2 x daily - 2 sets - 10 reps  - Prone Single Arm Shoulder Horizontal Abduction with Dumbbell - Palm Down  - 2 x daily - 2 sets - 10 reps      ASSESSMENT                                                                                                            Patient with minimal symptoms, but continued desire to receive ultrasound (US). Did provide patient with education on ultrasound (US) likely not producing long-term benefits. Patient demonstrates understanding of importance of active participation in interventions to produce long-term benefit. Demonstrates good tolerance to scapular strengthening and stabilization with no increase in familiar symptoms.  Education:   - Results of above outlined education: Verbalizes understanding and Demonstrates understanding    PLAN                                                                                                                           Suggestions for next session as indicated: Consider discontinuation of  ultrasound (US) if low levels of pain continue to be reported.  Continue cervical stabilization and scapular stabilization  Consider thoracic range of motion interventions (extension, rotation)       Therapy procedure time and total treatment time can be found documented on the Time Entry flowsheet     No

## 2023-09-20 ENCOUNTER — NON-APPOINTMENT (OUTPATIENT)
Age: 48
End: 2023-09-20

## 2023-09-20 ENCOUNTER — APPOINTMENT (OUTPATIENT)
Dept: CARDIOLOGY | Facility: CLINIC | Age: 48
End: 2023-09-20
Payer: COMMERCIAL

## 2023-09-20 VITALS — SYSTOLIC BLOOD PRESSURE: 130 MMHG | DIASTOLIC BLOOD PRESSURE: 82 MMHG

## 2023-09-20 VITALS
BODY MASS INDEX: 23.59 KG/M2 | DIASTOLIC BLOOD PRESSURE: 70 MMHG | OXYGEN SATURATION: 100 % | WEIGHT: 129 LBS | SYSTOLIC BLOOD PRESSURE: 112 MMHG | HEART RATE: 78 BPM

## 2023-09-20 DIAGNOSIS — I10 ESSENTIAL (PRIMARY) HYPERTENSION: ICD-10-CM

## 2023-09-20 DIAGNOSIS — I20.1 ANGINA PECTORIS WITH DOCUMENTED SPASM: ICD-10-CM

## 2023-09-20 PROCEDURE — 99214 OFFICE O/P EST MOD 30 MIN: CPT | Mod: 25

## 2023-09-20 PROCEDURE — 93000 ELECTROCARDIOGRAM COMPLETE: CPT

## 2023-10-04 ENCOUNTER — APPOINTMENT (OUTPATIENT)
Dept: DERMATOLOGY | Facility: CLINIC | Age: 48
End: 2023-10-04
Payer: COMMERCIAL

## 2023-10-04 DIAGNOSIS — D23.9 OTHER BENIGN NEOPLASM OF SKIN, UNSPECIFIED: ICD-10-CM

## 2023-10-04 DIAGNOSIS — B36.0 PITYRIASIS VERSICOLOR: ICD-10-CM

## 2023-10-04 PROCEDURE — 99214 OFFICE O/P EST MOD 30 MIN: CPT

## 2023-10-04 RX ORDER — KETOCONAZOLE 20.5 MG/ML
2 SHAMPOO, SUSPENSION TOPICAL
Qty: 1 | Refills: 5 | Status: ACTIVE | COMMUNITY
Start: 2023-10-04 | End: 1900-01-01

## 2023-10-17 ENCOUNTER — APPOINTMENT (OUTPATIENT)
Dept: CARDIOLOGY | Facility: CLINIC | Age: 48
End: 2023-10-17
Payer: COMMERCIAL

## 2023-10-17 PROCEDURE — 93880 EXTRACRANIAL BILAT STUDY: CPT

## 2024-01-23 ENCOUNTER — APPOINTMENT (OUTPATIENT)
Dept: OBGYN | Facility: CLINIC | Age: 49
End: 2024-01-23
Payer: COMMERCIAL

## 2024-01-23 VITALS
BODY MASS INDEX: 23.19 KG/M2 | HEIGHT: 62 IN | WEIGHT: 126 LBS | DIASTOLIC BLOOD PRESSURE: 74 MMHG | SYSTOLIC BLOOD PRESSURE: 116 MMHG

## 2024-01-23 DIAGNOSIS — Z12.4 ENCOUNTER FOR SCREENING FOR MALIGNANT NEOPLASM OF CERVIX: ICD-10-CM

## 2024-01-23 DIAGNOSIS — N95.1 MENOPAUSAL AND FEMALE CLIMACTERIC STATES: ICD-10-CM

## 2024-01-23 DIAGNOSIS — Z12.39 ENCOUNTER FOR OTHER SCREENING FOR MALIGNANT NEOPLASM OF BREAST: ICD-10-CM

## 2024-01-23 PROCEDURE — 82270 OCCULT BLOOD FECES: CPT

## 2024-01-23 PROCEDURE — 99396 PREV VISIT EST AGE 40-64: CPT

## 2024-01-23 NOTE — PHYSICAL EXAM
[Appropriately responsive] : appropriately responsive [Alert] : alert [No Acute Distress] : no acute distress [No Lymphadenopathy] : no lymphadenopathy [Soft] : soft [Non-tender] : non-tender [No HSM] : No HSM [Non-distended] : non-distended [No Lesions] : no lesions [No Mass] : no mass [Oriented x3] : oriented x3 [Examination Of The Breasts] : a normal appearance [No Masses] : no breast masses were palpable [Labia Majora] : normal [Labia Minora] : normal [IUD String] : an IUD string was noted [Normal] : normal [Uterine Adnexae] : normal [FreeTextEntry9] : Guac neg

## 2024-01-23 NOTE — HISTORY OF PRESENT ILLNESS
[FreeTextEntry1] : 47 yo  with lMP 22 for annual.  Amenorrhea given Mirena 2017  Menarche 15 2017 IUD placed menses persist at 5 days  OB: C/S Robson 2012 [place]  HTN: cardiomyopathy in  resolved Mild MVP  SH: single, works from home, [place] teenager

## 2024-01-23 NOTE — DISCUSSION/SUMMARY
[FreeTextEntry1] : Health Maintenance: -Pap with HPV and Gc/chl from Pap (pt active with partners) -Mammo Rx -TBSE -colonoscopy guidelines reviewed with patient. Names provided again. Guiac neg -Achieve Vit D levels of 30-40, intake of 1100 mg daily calcium mostly thru dark green leafy greens and milk products, exercise 30 minutes TIW  IUD: check FSH/Estradiol in June (7th year of Mirena) If Menopausal, ie high FSH and low estrogen, will remove Mirena If not menopausal, will discuss options at [place] visit

## 2024-01-24 LAB
C TRACH RRNA SPEC QL NAA+PROBE: NOT DETECTED
HPV HIGH+LOW RISK DNA PNL CVX: NOT DETECTED
N GONORRHOEA RRNA SPEC QL NAA+PROBE: NOT DETECTED
SOURCE TP AMPLIFICATION: NORMAL

## 2024-01-25 LAB — CYTOLOGY CVX/VAG DOC THIN PREP: NORMAL

## 2024-04-08 NOTE — ED STATDOCS - CARE PLAN
Where Is Your Acne Located?: Face, chest and back Principal Discharge DX:	Lower abdominal pain  Secondary Diagnosis:	Urinary tract infection with hematuria, site unspecified

## 2024-05-29 ENCOUNTER — APPOINTMENT (OUTPATIENT)
Dept: DERMATOLOGY | Facility: CLINIC | Age: 49
End: 2024-05-29
Payer: COMMERCIAL

## 2024-05-29 DIAGNOSIS — L81.4 OTHER MELANIN HYPERPIGMENTATION: ICD-10-CM

## 2024-05-29 DIAGNOSIS — C44.311 BASAL CELL CARCINOMA OF SKIN OF NOSE: ICD-10-CM

## 2024-05-29 DIAGNOSIS — D22.30 MELANOCYTIC NEVI OF UNSPECIFIED PART OF FACE: ICD-10-CM

## 2024-05-29 DIAGNOSIS — L72.3 SEBACEOUS CYST: ICD-10-CM

## 2024-05-29 PROCEDURE — 10060 I&D ABSCESS SIMPLE/SINGLE: CPT

## 2024-05-29 PROCEDURE — 99213 OFFICE O/P EST LOW 20 MIN: CPT | Mod: 25

## 2024-05-29 NOTE — ASSESSMENT
[FreeTextEntry1] : Complete skin examination is negative for malignancy; Multiple new concerns were addressed and discussed. Therapeutic options and their risks and benefits; along with multiple diagnostic possibilities were discussed at length; risks and benefits of skin biopsy and/or other further study were discussed;  BCC R nose ala 3/23;  healed well  nevus vs. fibroma L cheek;   I&D to inflamed milium within nevus  Continue regular exams; Follow up for TBSE in 6 months  also:  active T. Versicolor on chest/abd;  sent Rx for ketoconazole shampoo; has used in past

## 2024-05-29 NOTE — HISTORY OF PRESENT ILLNESS
[de-identified] : Pt. presents for skin check; c/o few spots of concern;  lesion on L cheek present since childhood- now with "hernández"- irritated Severity:  mild   Modifying factors:  none Associated symptoms:  none Context:  no association with activity Recent BCC R nose tx by D&C 3/2023

## 2024-05-29 NOTE — PHYSICAL EXAM
[FreeTextEntry3] : Skin examination performed of the face, neck, trunk, arms, legs;  The patient is well, alert and oriented, pleasant and cooperative. Eyelids, conjunctivae, oral mucosa, digits and nails all normal.   No cervical adenopathy.  Normal findings include:  Seborrheic keratoses Angiomas + lentigines and solar damage are present in sun exposed areas;  healed D&C scar R nose ala;  Left medial cheek:  + tan regular papule with inflamed milium  No lesions were suspicious for malignancy.

## 2024-06-03 LAB
ESTRADIOL SERPL-MCNC: <5 PG/ML
FSH SERPL-MCNC: 117 IU/L

## 2024-06-11 ENCOUNTER — APPOINTMENT (OUTPATIENT)
Dept: OBGYN | Facility: CLINIC | Age: 49
End: 2024-06-11
Payer: COMMERCIAL

## 2024-06-11 VITALS
BODY MASS INDEX: 23.37 KG/M2 | SYSTOLIC BLOOD PRESSURE: 144 MMHG | DIASTOLIC BLOOD PRESSURE: 82 MMHG | WEIGHT: 127 LBS | HEIGHT: 62 IN

## 2024-06-11 DIAGNOSIS — G43.001 MIGRAINE W/OUT AURA, NOT INTRACTABLE, WITH STATUS MIGRAINOSUS: ICD-10-CM

## 2024-06-11 DIAGNOSIS — Z78.0 ASYMPTOMATIC MENOPAUSAL STATE: ICD-10-CM

## 2024-06-11 DIAGNOSIS — Z30.431 ENCOUNTER FOR ROUTINE CHECKING OF INTRAUTERINE CONTRACEPTIVE DEVICE: ICD-10-CM

## 2024-06-11 DIAGNOSIS — E28.319 ASYMPTOMATIC PREMATURE MENOPAUSE: ICD-10-CM

## 2024-06-11 PROCEDURE — 99213 OFFICE O/P EST LOW 20 MIN: CPT

## 2024-06-11 RX ORDER — POTASSIUM CITRATE 10 MEQ/1
10 MEQ TABLET, EXTENDED RELEASE ORAL TWICE DAILY
Qty: 60 | Refills: 6 | Status: DISCONTINUED | COMMUNITY
Start: 2018-03-29 | End: 2024-06-11

## 2024-06-11 NOTE — HISTORY OF PRESENT ILLNESS
[FreeTextEntry1] : 47 yo  with Amenorrhea due to Mirena since 2017.  To discuss , Estradiol <5  Amenorrhea given Mirena 2017  Menarche 15 2017 IUD placed menses persist at 5 days  OB: C/S [place]  M Nick  HTN: cardiomyopathy in  resolved Mild MVP  SH: single, works from home, Nick teenager   [Mammogramdate] : 2/6/23 [TextBox_19] : BIRAD 1 fibroglandular density [PapSmeardate] : 1/23/24  [TextBox_31] : Pap and HPV neg

## 2024-06-11 NOTE — DISCUSSION/SUMMARY
[FreeTextEntry1] : Pt feeling well. she is surprised that she has blood results c/w menopause She is in a relationship and very concerned about NEVER conceiving. I explained the risks and benefits of IUD removal. The 7 year residual progesterone may be helping her. She would prefer to keep IUD in place until Jan. (Dec [place] trip) She has no signs of infection clinically nor on Pap.  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 She will not use this category for Migraines  Return with vaginal bleeding or other signs of vaginal infrection. Annual exam in Jan IUD removal to follow STD screening.

## 2024-08-15 ENCOUNTER — APPOINTMENT (OUTPATIENT)
Dept: UROLOGY | Facility: CLINIC | Age: 49
End: 2024-08-15
Payer: COMMERCIAL

## 2024-08-15 VITALS
OXYGEN SATURATION: 98 % | WEIGHT: 130 LBS | HEIGHT: 62 IN | HEART RATE: 87 BPM | SYSTOLIC BLOOD PRESSURE: 161 MMHG | DIASTOLIC BLOOD PRESSURE: 85 MMHG | BODY MASS INDEX: 23.92 KG/M2

## 2024-08-15 DIAGNOSIS — Z63.5 DISRUPTION OF FAMILY BY SEPARATION AND DIVORCE: ICD-10-CM

## 2024-08-15 DIAGNOSIS — N20.0 CALCULUS OF KIDNEY: ICD-10-CM

## 2024-08-15 PROCEDURE — 99203 OFFICE O/P NEW LOW 30 MIN: CPT

## 2024-08-15 SDOH — SOCIAL STABILITY - SOCIAL INSECURITY: DISRUPTION OF FAMILY BY SEPARATION AND DIVORCE: Z63.5

## 2024-08-15 NOTE — REVIEW OF SYSTEMS
[Negative] : Heme/Lymph [Heartburn] : heartburn [Presently in menopause ___] : presently in menopause [unfilled] [Told you have blood in urine on a urine test] : told blood was present in a urine test [see HPI] : see HPI [Hot Flashes] : hot flashes

## 2024-08-15 NOTE — HISTORY OF PRESENT ILLNESS
[FreeTextEntry1] : 48 year old woman seen 08/15/2024 with complaint of flank pain. She has history of kidney stones, s/p LEFT ureteral stent placement 2018. On K citrate. More recently, she felt severe RIGHT flank pain. Pain was 11/10, but subsided. no pain now. Was told she had blood in her urine on a test recently.

## 2024-08-15 NOTE — ASSESSMENT
[FreeTextEntry1] : 47 yo F with history of kidney stones and recent flank pain on RIGHT. Will send for RBUS and KUB. UA, UCx.

## 2024-08-16 LAB
APPEARANCE: CLEAR
BACTERIA: NEGATIVE /HPF
BILIRUBIN URINE: NEGATIVE
BLOOD URINE: NEGATIVE
CAST: 0 /LPF
COLOR: YELLOW
EPITHELIAL CELLS: 1 /HPF
GLUCOSE QUALITATIVE U: NEGATIVE MG/DL
KETONES URINE: NEGATIVE MG/DL
LEUKOCYTE ESTERASE URINE: ABNORMAL
MICROSCOPIC-UA: NORMAL
NITRITE URINE: NEGATIVE
PH URINE: 6
PROTEIN URINE: NEGATIVE MG/DL
RED BLOOD CELLS URINE: 1 /HPF
SPECIFIC GRAVITY URINE: 1.02
UROBILINOGEN URINE: 0.2 MG/DL
WHITE BLOOD CELLS URINE: 0 /HPF

## 2024-08-22 LAB — BACTERIA UR CULT: NORMAL

## 2024-09-10 ENCOUNTER — APPOINTMENT (OUTPATIENT)
Dept: OBGYN | Facility: CLINIC | Age: 49
End: 2024-09-10
Payer: COMMERCIAL

## 2024-09-10 VITALS
DIASTOLIC BLOOD PRESSURE: 88 MMHG | SYSTOLIC BLOOD PRESSURE: 134 MMHG | WEIGHT: 125 LBS | HEIGHT: 62 IN | BODY MASS INDEX: 23 KG/M2

## 2024-09-10 PROCEDURE — 58301 REMOVE INTRAUTERINE DEVICE: CPT

## 2024-09-10 NOTE — PROCEDURE
[IUD Removal] : intrauterine device (IUD) removal [Time out performed] : Pre-procedure time out performed.  Patient's name, date of birth and procedure confirmed. [Consent Obtained] : Consent obtained [Risks] : risks [Benefits] : benefits [Alternatives] : alternatives [Patient] : patient [Speculum Placed] : speculum placed [Strings Visualized] : strings visualized [IUD Discarded] : IUD discarded [Tolerated Well] : Patient tolerated the procedure well [No Complications] : no complications

## 2024-12-24 PROBLEM — F10.90 ALCOHOL USE: Status: ACTIVE | Noted: 2017-05-04

## 2025-01-15 ENCOUNTER — APPOINTMENT (OUTPATIENT)
Dept: DERMATOLOGY | Facility: CLINIC | Age: 50
End: 2025-01-15
Payer: COMMERCIAL

## 2025-01-15 ENCOUNTER — NON-APPOINTMENT (OUTPATIENT)
Age: 50
End: 2025-01-15

## 2025-01-15 DIAGNOSIS — L81.4 OTHER MELANIN HYPERPIGMENTATION: ICD-10-CM

## 2025-01-15 DIAGNOSIS — D22.5 MELANOCYTIC NEVI OF TRUNK: ICD-10-CM

## 2025-01-15 DIAGNOSIS — C44.311 BASAL CELL CARCINOMA OF SKIN OF NOSE: ICD-10-CM

## 2025-01-15 PROCEDURE — 99213 OFFICE O/P EST LOW 20 MIN: CPT

## 2025-01-15 RX ORDER — POTASSIUM CITRATE 15 MEQ/1
TABLET, EXTENDED RELEASE ORAL
Refills: 0 | Status: ACTIVE | COMMUNITY

## 2025-01-15 RX ORDER — RIMEGEPANT SULFATE 75 MG/75MG
TABLET, ORALLY DISINTEGRATING ORAL
Refills: 0 | Status: ACTIVE | COMMUNITY

## 2025-01-28 ENCOUNTER — APPOINTMENT (OUTPATIENT)
Dept: OBGYN | Facility: CLINIC | Age: 50
End: 2025-01-28

## 2025-02-11 ENCOUNTER — APPOINTMENT (OUTPATIENT)
Dept: OBGYN | Facility: CLINIC | Age: 50
End: 2025-02-11
Payer: COMMERCIAL

## 2025-02-11 VITALS
HEIGHT: 62 IN | BODY MASS INDEX: 23.55 KG/M2 | SYSTOLIC BLOOD PRESSURE: 124 MMHG | WEIGHT: 128 LBS | DIASTOLIC BLOOD PRESSURE: 68 MMHG

## 2025-02-11 DIAGNOSIS — Z01.419 ENCOUNTER FOR GYNECOLOGICAL EXAMINATION (GENERAL) (ROUTINE) W/OUT ABNORMAL FINDINGS: ICD-10-CM

## 2025-02-11 DIAGNOSIS — Z12.39 ENCOUNTER FOR OTHER SCREENING FOR MALIGNANT NEOPLASM OF BREAST: ICD-10-CM

## 2025-02-11 PROCEDURE — 99396 PREV VISIT EST AGE 40-64: CPT

## 2025-06-18 ENCOUNTER — APPOINTMENT (OUTPATIENT)
Dept: DERMATOLOGY | Facility: CLINIC | Age: 50
End: 2025-06-18
Payer: COMMERCIAL

## 2025-06-18 PROCEDURE — 99214 OFFICE O/P EST MOD 30 MIN: CPT
